# Patient Record
Sex: MALE | Race: WHITE | Employment: FULL TIME | URBAN - METROPOLITAN AREA
[De-identification: names, ages, dates, MRNs, and addresses within clinical notes are randomized per-mention and may not be internally consistent; named-entity substitution may affect disease eponyms.]

---

## 2021-09-18 PROCEDURE — 99285 EMERGENCY DEPT VISIT HI MDM: CPT

## 2021-09-19 ENCOUNTER — APPOINTMENT (EMERGENCY)
Dept: CT IMAGING | Facility: HOSPITAL | Age: 56
DRG: 440 | End: 2021-09-19
Payer: COMMERCIAL

## 2021-09-19 ENCOUNTER — HOSPITAL ENCOUNTER (INPATIENT)
Facility: HOSPITAL | Age: 56
LOS: 2 days | Discharge: OTHER INSTITUTION | DRG: 440 | End: 2021-09-21
Attending: EMERGENCY MEDICINE | Admitting: INTERNAL MEDICINE
Payer: COMMERCIAL

## 2021-09-19 ENCOUNTER — APPOINTMENT (INPATIENT)
Dept: ULTRASOUND IMAGING | Facility: HOSPITAL | Age: 56
DRG: 440 | End: 2021-09-19
Payer: COMMERCIAL

## 2021-09-19 ENCOUNTER — APPOINTMENT (INPATIENT)
Dept: MRI IMAGING | Facility: HOSPITAL | Age: 56
DRG: 440 | End: 2021-09-19
Payer: COMMERCIAL

## 2021-09-19 DIAGNOSIS — K85.90 PANCREATITIS: Primary | ICD-10-CM

## 2021-09-19 DIAGNOSIS — F10.10 ALCOHOL ABUSE: ICD-10-CM

## 2021-09-19 DIAGNOSIS — K80.20 GALLSTONES: ICD-10-CM

## 2021-09-19 PROBLEM — R10.9 ABDOMINAL PAIN: Status: ACTIVE | Noted: 2021-09-19

## 2021-09-19 PROBLEM — R93.89 ABNORMAL CT SCAN: Status: ACTIVE | Noted: 2021-09-19

## 2021-09-19 PROBLEM — K59.00 CONSTIPATION: Status: ACTIVE | Noted: 2021-09-19

## 2021-09-19 LAB
ALBUMIN SERPL BCP-MCNC: 3.3 G/DL (ref 3.5–5)
ALP SERPL-CCNC: 115 U/L (ref 46–116)
ALT SERPL W P-5'-P-CCNC: 39 U/L (ref 12–78)
AMPHETAMINES SERPL QL SCN: NEGATIVE
ANION GAP SERPL CALCULATED.3IONS-SCNC: 8 MMOL/L (ref 4–13)
AST SERPL W P-5'-P-CCNC: 29 U/L (ref 5–45)
BARBITURATES UR QL: NEGATIVE
BASOPHILS # BLD AUTO: 0.03 THOUSANDS/ΜL (ref 0–0.1)
BASOPHILS NFR BLD AUTO: 0 % (ref 0–1)
BENZODIAZ UR QL: POSITIVE
BILIRUB SERPL-MCNC: 0.26 MG/DL (ref 0.2–1)
BILIRUB UR QL STRIP: NEGATIVE
BUN SERPL-MCNC: 13 MG/DL (ref 5–25)
CALCIUM ALBUM COR SERPL-MCNC: 9.4 MG/DL (ref 8.3–10.1)
CALCIUM SERPL-MCNC: 8.8 MG/DL (ref 8.3–10.1)
CHLORIDE SERPL-SCNC: 103 MMOL/L (ref 100–108)
CHOLEST SERPL-MCNC: 170 MG/DL (ref 50–200)
CLARITY UR: CLEAR
CO2 SERPL-SCNC: 30 MMOL/L (ref 21–32)
COCAINE UR QL: NEGATIVE
COLOR UR: YELLOW
CREAT SERPL-MCNC: 0.97 MG/DL (ref 0.6–1.3)
EOSINOPHIL # BLD AUTO: 0.2 THOUSAND/ΜL (ref 0–0.61)
EOSINOPHIL NFR BLD AUTO: 2 % (ref 0–6)
ERYTHROCYTE [DISTWIDTH] IN BLOOD BY AUTOMATED COUNT: 12.5 % (ref 11.6–15.1)
GFR SERPL CREATININE-BSD FRML MDRD: 87 ML/MIN/1.73SQ M
GLUCOSE SERPL-MCNC: 104 MG/DL (ref 65–140)
GLUCOSE UR STRIP-MCNC: NEGATIVE MG/DL
HCT VFR BLD AUTO: 38.8 % (ref 36.5–49.3)
HDLC SERPL-MCNC: 38 MG/DL
HGB BLD-MCNC: 13.2 G/DL (ref 12–17)
HGB UR QL STRIP.AUTO: NEGATIVE
KETONES UR STRIP-MCNC: NEGATIVE MG/DL
LACTATE SERPL-SCNC: 1 MMOL/L (ref 0.5–2)
LDLC SERPL CALC-MCNC: 82 MG/DL (ref 0–100)
LEUKOCYTE ESTERASE UR QL STRIP: NEGATIVE
LIPASE SERPL-CCNC: 1162 U/L (ref 73–393)
LYMPHOCYTES # BLD AUTO: 2.36 THOUSANDS/ΜL (ref 0.6–4.47)
LYMPHOCYTES NFR BLD AUTO: 27 % (ref 14–44)
MCH RBC QN AUTO: 34.6 PG (ref 26.8–34.3)
MCHC RBC AUTO-ENTMCNC: 34 G/DL (ref 31.4–37.4)
MCV RBC AUTO: 102 FL (ref 82–98)
METHADONE UR QL: NEGATIVE
MONOCYTES # BLD AUTO: 0.97 THOUSAND/ΜL (ref 0.17–1.22)
MONOCYTES NFR BLD AUTO: 11 % (ref 4–12)
NEUTROPHILS # BLD AUTO: 5.35 THOUSANDS/ΜL (ref 1.85–7.62)
NEUTS SEG NFR BLD AUTO: 60 % (ref 43–75)
NITRITE UR QL STRIP: NEGATIVE
OPIATES UR QL SCN: NEGATIVE
OXYCODONE+OXYMORPHONE UR QL SCN: NEGATIVE
PCP UR QL: NEGATIVE
PH UR STRIP.AUTO: 6 [PH]
PLATELET # BLD AUTO: 155 THOUSANDS/UL (ref 149–390)
PMV BLD AUTO: 10.5 FL (ref 8.9–12.7)
POTASSIUM SERPL-SCNC: 4.5 MMOL/L (ref 3.5–5.3)
PROT SERPL-MCNC: 7 G/DL (ref 6.4–8.2)
PROT UR STRIP-MCNC: NEGATIVE MG/DL
RBC # BLD AUTO: 3.81 MILLION/UL (ref 3.88–5.62)
SODIUM SERPL-SCNC: 141 MMOL/L (ref 136–145)
SP GR UR STRIP.AUTO: 1.02 (ref 1–1.03)
THC UR QL: NEGATIVE
TRIGL SERPL-MCNC: 252 MG/DL
UROBILINOGEN UR QL STRIP.AUTO: 0.2 E.U./DL
WBC # BLD AUTO: 8.91 THOUSAND/UL (ref 4.31–10.16)

## 2021-09-19 PROCEDURE — 96374 THER/PROPH/DIAG INJ IV PUSH: CPT

## 2021-09-19 PROCEDURE — 85025 COMPLETE CBC W/AUTO DIFF WBC: CPT | Performed by: EMERGENCY MEDICINE

## 2021-09-19 PROCEDURE — 74177 CT ABD & PELVIS W/CONTRAST: CPT

## 2021-09-19 PROCEDURE — G1004 CDSM NDSC: HCPCS

## 2021-09-19 PROCEDURE — 99222 1ST HOSP IP/OBS MODERATE 55: CPT | Performed by: INTERNAL MEDICINE

## 2021-09-19 PROCEDURE — 96375 TX/PRO/DX INJ NEW DRUG ADDON: CPT

## 2021-09-19 PROCEDURE — 96361 HYDRATE IV INFUSION ADD-ON: CPT

## 2021-09-19 PROCEDURE — 83605 ASSAY OF LACTIC ACID: CPT | Performed by: EMERGENCY MEDICINE

## 2021-09-19 PROCEDURE — A9585 GADOBUTROL INJECTION: HCPCS | Performed by: INTERNAL MEDICINE

## 2021-09-19 PROCEDURE — 74183 MRI ABD W/O CNTR FLWD CNTR: CPT

## 2021-09-19 PROCEDURE — 99222 1ST HOSP IP/OBS MODERATE 55: CPT | Performed by: FAMILY MEDICINE

## 2021-09-19 PROCEDURE — 83690 ASSAY OF LIPASE: CPT | Performed by: EMERGENCY MEDICINE

## 2021-09-19 PROCEDURE — 80061 LIPID PANEL: CPT | Performed by: INTERNAL MEDICINE

## 2021-09-19 PROCEDURE — C9113 INJ PANTOPRAZOLE SODIUM, VIA: HCPCS | Performed by: INTERNAL MEDICINE

## 2021-09-19 PROCEDURE — 76705 ECHO EXAM OF ABDOMEN: CPT

## 2021-09-19 PROCEDURE — 81003 URINALYSIS AUTO W/O SCOPE: CPT | Performed by: EMERGENCY MEDICINE

## 2021-09-19 PROCEDURE — 80053 COMPREHEN METABOLIC PANEL: CPT | Performed by: EMERGENCY MEDICINE

## 2021-09-19 PROCEDURE — 99285 EMERGENCY DEPT VISIT HI MDM: CPT | Performed by: EMERGENCY MEDICINE

## 2021-09-19 PROCEDURE — 36415 COLL VENOUS BLD VENIPUNCTURE: CPT | Performed by: EMERGENCY MEDICINE

## 2021-09-19 PROCEDURE — 80307 DRUG TEST PRSMV CHEM ANLYZR: CPT | Performed by: EMERGENCY MEDICINE

## 2021-09-19 RX ORDER — OXYCODONE HYDROCHLORIDE 10 MG/1
10 TABLET ORAL EVERY 4 HOURS PRN
Status: DISCONTINUED | OUTPATIENT
Start: 2021-09-19 | End: 2021-09-21 | Stop reason: HOSPADM

## 2021-09-19 RX ORDER — BACLOFEN 20 MG/1
20 TABLET ORAL 3 TIMES DAILY PRN
COMMUNITY

## 2021-09-19 RX ORDER — OXYCODONE HYDROCHLORIDE 5 MG/1
5 TABLET ORAL EVERY 4 HOURS PRN
Status: DISCONTINUED | OUTPATIENT
Start: 2021-09-19 | End: 2021-09-19

## 2021-09-19 RX ORDER — NICOTINE 21 MG/24HR
1 PATCH, TRANSDERMAL 24 HOURS TRANSDERMAL DAILY
Status: DISCONTINUED | OUTPATIENT
Start: 2021-09-19 | End: 2021-09-19

## 2021-09-19 RX ORDER — CLONIDINE HYDROCHLORIDE 0.1 MG/1
0.1 TABLET ORAL 3 TIMES DAILY PRN
COMMUNITY

## 2021-09-19 RX ORDER — ONDANSETRON 2 MG/ML
4 INJECTION INTRAMUSCULAR; INTRAVENOUS EVERY 6 HOURS PRN
Status: DISCONTINUED | OUTPATIENT
Start: 2021-09-19 | End: 2021-09-21 | Stop reason: HOSPADM

## 2021-09-19 RX ORDER — ONDANSETRON 2 MG/ML
4 INJECTION INTRAMUSCULAR; INTRAVENOUS ONCE
Status: COMPLETED | OUTPATIENT
Start: 2021-09-19 | End: 2021-09-19

## 2021-09-19 RX ORDER — MULTIVITAMIN
1 TABLET ORAL DAILY
COMMUNITY

## 2021-09-19 RX ORDER — PANTOPRAZOLE SODIUM 40 MG/1
40 INJECTION, POWDER, FOR SOLUTION INTRAVENOUS ONCE
Status: COMPLETED | OUTPATIENT
Start: 2021-09-19 | End: 2021-09-19

## 2021-09-19 RX ORDER — MAGNESIUM OXIDE/MAG AA CHELATE 300 MG
350 CAPSULE ORAL DAILY
COMMUNITY

## 2021-09-19 RX ORDER — HYDROMORPHONE HCL/PF 1 MG/ML
0.5 SYRINGE (ML) INJECTION EVERY 6 HOURS PRN
Status: DISCONTINUED | OUTPATIENT
Start: 2021-09-19 | End: 2021-09-21 | Stop reason: HOSPADM

## 2021-09-19 RX ORDER — MORPHINE SULFATE 4 MG/ML
4 INJECTION, SOLUTION INTRAMUSCULAR; INTRAVENOUS ONCE
Status: COMPLETED | OUTPATIENT
Start: 2021-09-19 | End: 2021-09-19

## 2021-09-19 RX ORDER — GAUZE BANDAGE 2" X 2"
100 BANDAGE TOPICAL DAILY
COMMUNITY

## 2021-09-19 RX ORDER — DOXEPIN HYDROCHLORIDE 50 MG/1
50 CAPSULE ORAL
COMMUNITY

## 2021-09-19 RX ORDER — LANOLIN ALCOHOL/MO/W.PET/CERES
100 CREAM (GRAM) TOPICAL DAILY
Status: DISCONTINUED | OUTPATIENT
Start: 2021-09-19 | End: 2021-09-21 | Stop reason: HOSPADM

## 2021-09-19 RX ORDER — LEVETIRACETAM 750 MG/1
750 TABLET ORAL EVERY 12 HOURS
COMMUNITY

## 2021-09-19 RX ORDER — KETOROLAC TROMETHAMINE 30 MG/ML
15 INJECTION, SOLUTION INTRAMUSCULAR; INTRAVENOUS EVERY 6 HOURS PRN
Status: DISPENSED | OUTPATIENT
Start: 2021-09-19 | End: 2021-09-21

## 2021-09-19 RX ORDER — HYDROMORPHONE HCL/PF 1 MG/ML
0.5 SYRINGE (ML) INJECTION ONCE
Status: COMPLETED | OUTPATIENT
Start: 2021-09-19 | End: 2021-09-19

## 2021-09-19 RX ORDER — HYDROXYZINE HYDROCHLORIDE 25 MG/1
25 TABLET, FILM COATED ORAL EVERY 4 HOURS PRN
COMMUNITY

## 2021-09-19 RX ORDER — PHENOL 1.4 %
10 AEROSOL, SPRAY (ML) MUCOUS MEMBRANE
COMMUNITY

## 2021-09-19 RX ORDER — SODIUM CHLORIDE 9 MG/ML
150 INJECTION, SOLUTION INTRAVENOUS CONTINUOUS
Status: DISCONTINUED | OUTPATIENT
Start: 2021-09-19 | End: 2021-09-21

## 2021-09-19 RX ORDER — ACETAMINOPHEN 325 MG/1
650 TABLET ORAL EVERY 6 HOURS PRN
Status: DISCONTINUED | OUTPATIENT
Start: 2021-09-19 | End: 2021-09-21 | Stop reason: HOSPADM

## 2021-09-19 RX ORDER — FOLIC ACID 1 MG/1
TABLET ORAL DAILY
COMMUNITY

## 2021-09-19 RX ORDER — FOLIC ACID 1 MG/1
1 TABLET ORAL DAILY
Status: DISCONTINUED | OUTPATIENT
Start: 2021-09-19 | End: 2021-09-21 | Stop reason: HOSPADM

## 2021-09-19 RX ORDER — OXAZEPAM 15 MG/1
15 CAPSULE ORAL AS NEEDED
COMMUNITY

## 2021-09-19 RX ORDER — NICOTINE 21 MG/24HR
1 PATCH, TRANSDERMAL 24 HOURS TRANSDERMAL DAILY
Status: DISCONTINUED | OUTPATIENT
Start: 2021-09-19 | End: 2021-09-21 | Stop reason: HOSPADM

## 2021-09-19 RX ADMIN — THIAMINE HCL TAB 100 MG 100 MG: 100 TAB at 10:13

## 2021-09-19 RX ADMIN — SODIUM CHLORIDE 100 ML/HR: 0.9 INJECTION, SOLUTION INTRAVENOUS at 05:38

## 2021-09-19 RX ADMIN — FOLIC ACID 1 MG: 1 TABLET ORAL at 10:13

## 2021-09-19 RX ADMIN — ONDANSETRON 4 MG: 2 INJECTION INTRAMUSCULAR; INTRAVENOUS at 02:54

## 2021-09-19 RX ADMIN — GADOBUTROL 8 ML: 604.72 INJECTION INTRAVENOUS at 16:46

## 2021-09-19 RX ADMIN — HYDROMORPHONE HYDROCHLORIDE 0.5 MG: 1 INJECTION, SOLUTION INTRAMUSCULAR; INTRAVENOUS; SUBCUTANEOUS at 05:39

## 2021-09-19 RX ADMIN — MORPHINE SULFATE 4 MG: 4 INJECTION INTRAVENOUS at 02:53

## 2021-09-19 RX ADMIN — ENOXAPARIN SODIUM 40 MG: 40 INJECTION SUBCUTANEOUS at 08:49

## 2021-09-19 RX ADMIN — OXYCODONE HYDROCHLORIDE 10 MG: 10 TABLET ORAL at 13:57

## 2021-09-19 RX ADMIN — SODIUM CHLORIDE 1000 ML: 0.9 INJECTION, SOLUTION INTRAVENOUS at 02:54

## 2021-09-19 RX ADMIN — Medication 1 PATCH: at 05:50

## 2021-09-19 RX ADMIN — OXYCODONE HYDROCHLORIDE 10 MG: 10 TABLET ORAL at 08:49

## 2021-09-19 RX ADMIN — IOHEXOL 100 ML: 350 INJECTION, SOLUTION INTRAVENOUS at 03:11

## 2021-09-19 RX ADMIN — Medication 1 PATCH: at 18:38

## 2021-09-19 RX ADMIN — MULTIPLE VITAMINS W/ MINERALS TAB 1 TABLET: TAB ORAL at 10:13

## 2021-09-19 RX ADMIN — PANTOPRAZOLE SODIUM 40 MG: 40 INJECTION, POWDER, FOR SOLUTION INTRAVENOUS at 10:14

## 2021-09-19 RX ADMIN — HYDROMORPHONE HYDROCHLORIDE 0.5 MG: 1 INJECTION, SOLUTION INTRAMUSCULAR; INTRAVENOUS; SUBCUTANEOUS at 21:34

## 2021-09-19 RX ADMIN — OXYCODONE HYDROCHLORIDE 10 MG: 10 TABLET ORAL at 18:08

## 2021-09-19 NOTE — ASSESSMENT & PLAN NOTE
· Patient with history of alcohol abuse  · Currently attending [facility]  · Patient reports last drink was approximately 1 week ago  · No signs of withdrawal noted this time  · Continue CIWA protocol

## 2021-09-19 NOTE — ASSESSMENT & PLAN NOTE
Patient currently attending [facility] for alcohol addiction rehab  Last drink three days ago with no withdrawal complaints  Has been constipated for past 6 days, with associated epigastric abdominal pain, radiating to RLQ, nausea and two episodes of vomiting  Received an enema at the recovery center which did not produce a BM  Patient then sent to ED for medical clearance  While in the waiting room the patient had a large BM  Currently reporting epigastric abdominal pain, without nausea or other symptom      /84 (BP Location: Left arm)   Pulse 73   Temp 98 °F (36 7 °C) (Tympanic)   Resp 19   Ht 6' 2" (1 88 m)   Wt 88 5 kg (195 lb)   SpO2 98%   BMI 25 04 kg/m²     · CT suggestive of pancreatitis   · No leukocytosis, no transaminitis, lactic acid WNL, no acute electrolyte abnormality  · UA WNL, drug screen pending  · Likely alcoholic pancreatitis  · NPO, Continuous IVF hydration, prn zofran, pain control ordered   · AM Lipase, CBC, and CMP ordered  · GI consultation

## 2021-09-19 NOTE — ASSESSMENT & PLAN NOTE
· CT abd w/ contrast showing:  ·  Ill-defined hypodensities in the R-lobe of the liver, MRI recommended  · Noncalcified stone in the gallbladder neck  · MRI report noted with moderate hepatomegaly, hemangioma, small cyst   · Right upper quadrant also report noted with distended gallbladder sludge, no evidence of cholelithiasis, cholecystitis, CBD dilation    · GI following

## 2021-09-19 NOTE — ASSESSMENT & PLAN NOTE
· Patient with history of alcohol abuse  · Currently attending [facility] for rehabilitation  · Last drink three days ago with no withdrawal complaints  · Patient confident he's done drinking

## 2021-09-19 NOTE — PLAN OF CARE
Problem: PAIN - ADULT  Goal: Verbalizes/displays adequate comfort level or baseline comfort level  Description: Interventions:  - Encourage patient to monitor pain and request assistance  - Assess pain using appropriate pain scale  - Administer analgesics based on type and severity of pain and evaluate response  - Implement non-pharmacological measures as appropriate and evaluate response  - Consider cultural and social influences on pain and pain management  - Notify physician/advanced practitioner if interventions unsuccessful or patient reports new pain  Outcome: Progressing     Problem: INFECTION - ADULT  Goal: Absence or prevention of progression during hospitalization  Description: INTERVENTIONS:  - Assess and monitor for signs and symptoms of infection  - Monitor lab/diagnostic results  - Monitor all insertion sites, i e  indwelling lines, tubes, and drains  - Monitor endotracheal if appropriate and nasal secretions for changes in amount and color  - [place] appropriate cooling/warming therapies per order  - Administer medications as ordered  - Instruct and encourage patient and family to use good hand hygiene technique  - Identify and instruct in appropriate isolation precautions for identified infection/condition  Outcome: Progressing  Goal: Absence of fever/infection during neutropenic period  Description: INTERVENTIONS:  - Monitor WBC    Outcome: Progressing     Problem: SAFETY ADULT  Goal: Patient will remain free of falls  Description: INTERVENTIONS:  - Educate patient/family on patient safety including physical limitations  - Instruct patient to call for assistance with activity   - Consult OT/PT to assist with strengthening/mobility   - Keep Call bell within reach  - Keep bed low and locked with side rails adjusted as appropriate  - Keep care items and personal belongings within reach  - Initiate and maintain comfort rounds  - Make Fall Risk Sign visible to staff  - Offer Toileting every 2 Hours, in advance of need  - Initiate/Maintain bed alarm  - Obtain necessary fall risk management equipment: chair alarm  - Apply yellow socks and bracelet for high fall risk patients  - Consider moving patient to room near nurses station  Outcome: Progressing  Goal: Maintain or return to baseline ADL function  Description: INTERVENTIONS:  -  Assess patient's ability to carry out ADLs; assess patient's baseline for ADL function and identify physical deficits which impact ability to perform ADLs (bathing, care of mouth/teeth, toileting, grooming, dressing, etc )  - Assess/evaluate cause of self-care deficits   - Assess range of motion  - Assess patient's mobility; develop plan if impaired  - Assess patient's need for assistive devices and provide as appropriate  - Encourage maximum independence but intervene and supervise when necessary  - Involve family in performance of ADLs  - Assess for home care needs following discharge   - Consider OT consult to assist with ADL evaluation and planning for discharge  - Provide patient education as appropriate  Outcome: Progressing  Goal: Maintains/Returns to pre admission functional level  Description: INTERVENTIONS:  - Perform BMAT or MOVE assessment daily    - Set and communicate daily mobility goal to care team and patient/family/caregiver  - Collaborate with rehabilitation services on mobility goals if consulted  - Perform Range of Motion 3 times a day  - Reposition patient every 3 hours    - Dangle patient 3 times a day  - Stand patient 3 times a day  - Ambulate patient 3 times a day  - Out of bed to chair 3 times a day   - Out of bed for meals 3 times a day  - Out of bed for toileting  - Record patient progress and toleration of activity level   Outcome: Progressing     Problem: DISCHARGE PLANNING  Goal: Discharge to home or other facility with appropriate resources  Description: INTERVENTIONS:  - Identify barriers to discharge w/patient and caregiver  - Arrange for needed discharge resources and transportation as appropriate  - Identify discharge learning needs (meds, wound care, etc )  - Arrange for interpretive services to assist at discharge as needed  - Refer to Case Management Department for coordinating discharge planning if the patient needs post-hospital services based on physician/advanced practitioner order or complex needs related to functional status, cognitive ability, or social support system  Outcome: Progressing     Problem: Knowledge Deficit  Goal: Patient/family/caregiver demonstrates understanding of disease process, treatment plan, medications, and discharge instructions  Description: Complete learning assessment and assess knowledge base    Interventions:  - Provide teaching at level of understanding  - Provide teaching via preferred learning methods  Outcome: Progressing     Problem: GASTROINTESTINAL - ADULT  Goal: Minimal or absence of nausea and/or vomiting  Description: INTERVENTIONS:  - Administer IV fluids if ordered to ensure adequate hydration  - Maintain NPO status until nausea and vomiting are resolved  - Nasogastric tube if ordered  - Administer ordered antiemetic medications as needed  - Provide nonpharmacologic comfort measures as appropriate  - Advance diet as tolerated, if ordered  - Consider nutrition services referral to assist patient with adequate nutrition and appropriate food choices  Outcome: Progressing  Goal: Maintains or returns to baseline bowel function  Description: INTERVENTIONS:  - Assess bowel function  - Encourage oral fluids to ensure adequate hydration  - Administer IV fluids if ordered to ensure adequate hydration  - Administer ordered medications as needed  - Encourage mobilization and activity  - Consider nutritional services referral to assist patient with adequate nutrition and appropriate food choices  Outcome: Progressing  Goal: Maintains adequate nutritional intake  Description: INTERVENTIONS:  - Monitor percentage of each meal consumed  - Identify factors contributing to decreased intake, treat as appropriate  - Assist with meals as needed  - Monitor I&O, weight, and lab values if indicated  - Obtain nutrition services referral as needed  Outcome: Progressing

## 2021-09-19 NOTE — CONSULTS
Consultation - [facility]  Radha Womack 64 y o  male MRN: [id number]  Unit/Bed#: ED 23 Encounter: [id number]        Consults    Reason for Consult / Principal Problem: pancreatitis, abnormal CT scan    HPI: This 64year old male with a long history of alcohol abuse was attending the [address] [place] [address] for alcohol rehab when he developed the sudden onset of epigastric abdominal pain radiating to the right upper quadrant  Pain was associated with nausea and two episodes of vomiting  No hematemesis  His last alcohol was 3 days earlier  Patient has had one prior episode of pancreatitis  He was also complaining of constipation with no bowel movement for 6 days  No history of narcotic analgesics  He was given an enema at the rehab facility with no BM  Patient came to the ER and it was documented that he had a large BM  CT scan of the abdomen shows right basilar subsegmental atelectatic changes, a fatty liver, minimal fluid about the body and tail, calcification in the head of the pancreas, air fluid level in the rectum, diverticulosis, an ill-defined area of hypodensity in the right lobe of the liver, a questionable noncalcified stone in the gallbladder neck, a 2 8 X 1 7 cm well circumscribed exophytic lesion from the left kidney which does not meet CT criteria for a simple cyst       The CBC is normal    The liver enzymes were normal   Lipase level was 1,162  REVIEW OF SYSTEMS:    CONSTITUTIONAL: Denies any fever, chills, or rigors  Good appetite, and no recent weight loss  HEENT: No earache or tinnitus  Denies hearing loss or visual disturbances  CARDIOVASCULAR: No chest pain or palpitations  RESPIRATORY: Denies any cough, hemoptysis, shortness of breath or dyspnea on exertion  GASTROINTESTINAL: As noted in the History of Present Illness  GENITOURINARY: No problems with urination  Denies any hematuria or dysuria    NEUROLOGIC: No dizziness or vertigo, denies headaches  Admits to numbness  MUSCULOSKELETAL: Denies any muscle or joint pain  SKIN: Denies skin rashes or itching  ENDOCRINE: Denies excessive thirst  Denies intolerance to heat or cold  PSYCHOSOCIAL: Denies depression or anxiety  Denies any recent memory loss  Historical Information   History reviewed  No pertinent past medical history  Past Surgical History:   Procedure Laterality Date    FRACTURE SURGERY       Social History   Social History     Substance and Sexual Activity   Alcohol Use Not Currently     Social History     Substance and Sexual Activity   Drug Use Not Currently     Social History     Tobacco Use   Smoking Status Current Every Day Smoker    Packs/day: 1 00    Types: Cigarettes   Smokeless Tobacco Never Used     History reviewed  No pertinent family history  Meds/Allergies     (Not in a hospital admission)    Current Facility-Administered Medications   Medication Dose Route Frequency    acetaminophen (TYLENOL) tablet 650 mg  650 mg Oral Q6H PRN    enoxaparin (LOVENOX) subcutaneous injection 40 mg  40 mg Subcutaneous Daily    folic acid (FOLVITE) tablet 1 mg  1 mg Oral Daily    HYDROmorphone (DILAUDID) injection 0 5 mg  0 5 mg Intravenous Q6H PRN    ketorolac (TORADOL) injection 15 mg  15 mg Intravenous Q6H PRN    multivitamin-minerals (CENTRUM) tablet 1 tablet  1 tablet Oral Daily    nicotine (NICODERM CQ) 21 mg/24 hr TD 24 hr patch 1 patch  1 patch Transdermal Daily    ondansetron (ZOFRAN) injection 4 mg  4 mg Intravenous Q6H PRN    oxyCODONE (ROXICODONE) immediate release tablet 10 mg  10 mg Oral Q4H PRN    sodium chloride 0 9 % infusion  150 mL/hr Intravenous Continuous    thiamine tablet 100 mg  100 mg Oral Daily       No Known Allergies        Objective     Blood pressure 137/84, pulse 73, temperature 98 °F (36 7 °C), temperature source Tympanic, resp  rate 19, height 6' 2" (1 88 m), weight 88 5 kg (195 lb), SpO2 98 %      No intake or output data in the 24 hours ending 09/19/21 1026      PHYSICAL EXAM:      General Appearance:   Alert, cooperative, no distress, appears stated age    HEENT:   Normocephalic, atraumatic, anicteric, PERRLA   Neck:  Supple, symmetrical, trachea midline, no adenopathy;    thyroid: no enlargement/tenderness/nodules; no carotid  bruit or JVD    Lungs:   Clear to auscultation bilaterally; no rales, rhonchi or wheezing; respirations unlabored    Heart[de-identified]   S1 and S2 normal; regular rate and rhythm; no murmur, rub, or gallop  Abdomen:   Soft, tender epigastrium and left upper quadrant, non-distended; normal bowel sounds; no masses, no organomegaly, no rebound or guarding   Genitalia:   Deferred    Rectal:   Deferred    Extremities:  No cyanosis, clubbing or edema    Pulses:  2+ and symmetric all extremities    Skin:  Skin color, texture, turgor normal, no rashes or lesions    Lymph nodes:  No palpable cervical, axillary or inguinal lymphadenopathy        Lab Results:   Results from last 7 days   Lab Units 09/19/21  0044   WBC Thousand/uL 8 91   HEMOGLOBIN g/dL 13 2   HEMATOCRIT % 38 8   PLATELETS Thousands/uL 155   NEUTROS PCT % 60   LYMPHS PCT % 27   MONOS PCT % 11   EOS PCT % 2     Results from last 7 days   Lab Units 09/19/21  0044   POTASSIUM mmol/L 4 5   CHLORIDE mmol/L 103   CO2 mmol/L 30   BUN mg/dL 13   CREATININE mg/dL 0 97   CALCIUM mg/dL 8 8   ALK PHOS U/L 115   ALT U/L 39   AST U/L 29         Results from last 7 days   Lab Units 09/19/21  0044   LIPASE u/L 1,162*       Imaging Studies: I have personally reviewed pertinent imaging studies  CT abdomen pelvis with contrast    Result Date: 9/19/2021  Impression: There is minimal fluid about the body and tail the pancreas raising the possibility of pancreatitis  Recommend correlation with laboratory results    There are ill-defined areas of hypodensity in the right lobe of the liver (2:15; 2:23) for which elective MRI abdomen with contrast, hepatic protocol, is recommended for further evaluation    There is a questionable noncalcified stone at the gallbladder neck  Borderline pericholecystic fluid  Recommend clinical correlation  Consider additional imaging such as HIDA scan or ultrasound  2 8 x 1 7 cm well-circumscribed exophytic lesion from the left kidney which does not meet CT criteria for simple cyst  Recommend elective ultrasound or MRI for further evaluation  Air-fluid level in the rectum compatible with the provided history of recent prior enema  Wall thickening of the distal sigmoid colon and rectum with loss of haustration concerning for colitis  There is diverticulosis  No evidence for diverticulitis  The study was marked in [facility] for immediate notification  Workstation performed: [id number]       ASSESSMENT and PLAN:      1  Acute pancreatitis, alcohol induced  - second pancreatitis episode  - liver enzymes normal  - CT demonstrates minimal fluid about the body and tail, fatty liver  - lipase 1,162; no value to continue following lipase levels  - Patient advised that all alcohol must stop to alter the future of his pancreatitis  - NPO, pain management, antiemetics prn, incentive spirometry  - IV fluids at 200 cc/hour  - moniter labs daily, CMP, CBC    2  Alcoholism  - Patient attending [facility] for alcohol rehab  - liver enzymes normal     3  Constipation    4   Abnormal CT scan of the liver  - ill-defined hypodensity in the right lobe of liver  - MRI ordered  - Non-calcified stone in the GB neck, US or HIDA recommended; US ordered

## 2021-09-19 NOTE — ED NOTES
Patient returned from [facility] scan     Amie Cai RN  09/19/21 0603

## 2021-09-19 NOTE — ED PROVIDER NOTES
History  Chief Complaint   Patient presents with    Abdominal Pain     Patient co abdominal pain  Patient reports no bowel movement time 6 days  Patient reports taking enema with no relief  HPI     63 yo M presents from [facility] for alcohol addiction, last drink three days ago with no complaints of withdrawal presents for medical clearance after being constipated for 6 days  He has no complaints now  States he was constipated and no BM for 6 days  Was given enema at recovery center and still no BM so sent to the ER  Patient while in the waiting room had a large BM and has since had no symptoms  No abd pain n/v  He denies recent opioid use  He states he still needs a letter giving medical clearance to go back to [facility]  Prior to Admission Medications   Prescriptions Last Dose Informant Patient Reported? Taking?    Magnesium 300 MG CAPS   Yes Yes   Sig: Take 350 mg by mouth daily   Melatonin 10 MG TABS   Yes Yes   Sig: Take 10 mg by mouth daily at bedtime as needed   Multiple Vitamin (multivitamin) tablet   Yes Yes   Sig: Take 1 tablet by mouth daily   Thiamine Mononitrate (VITAMIN B1) 100 mg tablet   Yes Yes   Sig: Take 100 mg by mouth daily   baclofen 20 mg tablet   Yes Yes   Sig: Take 20 mg by mouth 3 (three) times a day as needed for muscle spasms   cloNIDine (CATAPRES) 0 1 mg tablet   Yes Yes   Sig: Take 0 1 mg by mouth 3 (three) times a day as needed for high blood pressure   doxepin (SINEquan) 50 mg capsule   Yes Yes   Sig: Take 50 mg by mouth daily at bedtime as needed for sleep (If melatonin is ineffective)   folic acid (FOLVITE) 1 mg tablet   Yes Yes   Sig: Take by mouth daily   hydrOXYzine HCL (ATARAX) 25 mg tablet   Yes Yes   Sig: Take 25 mg by mouth every 4 (four) hours as needed for itching   levETIRAcetam (KEPPRA) 750 mg tablet   Yes Yes   Sig: Take 750 mg by mouth every 12 (twelve) hours   oxazepam (SERAX) 15 mg capsule   Yes Yes   Sig: Take 15 mg by mouth as needed for withdrawal      Facility-Administered Medications: None       History reviewed  No pertinent past medical history  Past Surgical History:   Procedure Laterality Date    FRACTURE SURGERY         History reviewed  No pertinent family history  I have reviewed and agree with the history as documented  E-Cigarette/Vaping     E-Cigarette/Vaping Substances     Social History     Tobacco Use    Smoking status: Current Every Day Smoker     Packs/day: 1 00     Types: Cigarettes    Smokeless tobacco: Never Used   Substance Use Topics    Alcohol use: Not Currently    Drug use: Not Currently       Review of Systems   Constitutional: Negative for chills, fatigue and fever  HENT: Negative for nosebleeds and sore throat  Eyes: Negative for redness and visual disturbance  Respiratory: Negative for shortness of breath and wheezing  Cardiovascular: Negative for chest pain and palpitations  Gastrointestinal: Negative for abdominal pain and diarrhea  Endocrine: Negative for polyuria  Genitourinary: Negative for difficulty urinating and testicular pain  Musculoskeletal: Negative for back pain and neck stiffness  Skin: Negative for rash and wound  Neurological: Negative for seizures, speech difficulty and headaches  Psychiatric/Behavioral: Negative for dysphoric mood and hallucinations  All other systems reviewed and are negative  Physical Exam  Physical Exam  Vitals and nursing note reviewed  Constitutional:       Appearance: He is well-developed  HENT:      Head: Normocephalic and atraumatic  Right Ear: External ear normal       Left Ear: External ear normal    Eyes:      Conjunctiva/sclera: Conjunctivae normal    Cardiovascular:      Rate and Rhythm: Normal rate and regular rhythm  Heart sounds: Normal heart sounds  Pulmonary:      Effort: Pulmonary effort is normal       Breath sounds: Normal breath sounds  No wheezing  Chest:      Chest wall: No tenderness     Abdominal: General: Bowel sounds are normal       Palpations: Abdomen is soft  Tenderness: There is no abdominal tenderness  There is no guarding  Comments: Abdomen completely soft and non tender   Musculoskeletal:         General: Normal range of motion  Cervical back: Normal range of motion  Skin:     General: Skin is warm and dry  Findings: No rash  Neurological:      Mental Status: He is alert and oriented to person, place, and time  Cranial Nerves: No cranial nerve deficit  Sensory: No sensory deficit  Motor: No abnormal muscle tone        Coordination: Coordination normal          Vital Signs  ED Triage Vitals   Temperature Pulse Respirations Blood Pressure SpO2   09/18/21 2146 09/18/21 2146 09/18/21 2146 09/18/21 2146 09/18/21 2146   98 °F (36 7 °C) 101 19 (!) 180/103 100 %      Temp Source Heart Rate Source Patient Position - Orthostatic VS BP Location FiO2 (%)   09/18/21 2146 09/18/21 2146 -- 09/18/21 2146 --   Tympanic Monitor  Right arm       Pain Score       09/19/21 0253       8           Vitals:    09/19/21 1835 09/19/21 2230 09/20/21 0721 09/20/21 1546   BP: 127/90 128/76 130/86 125/86   Pulse: 63 60 58 59         Visual Acuity      ED Medications  Medications   acetaminophen (TYLENOL) tablet 650 mg (650 mg Oral Given 9/20/21 0814)   ondansetron (ZOFRAN) injection 4 mg (has no administration in time range)   sodium chloride 0 9 % infusion (150 mL/hr Intravenous New Bag 9/20/21 1638)   enoxaparin (LOVENOX) subcutaneous injection 40 mg (40 mg Subcutaneous Given 9/20/21 0802)   oxyCODONE (ROXICODONE) immediate release tablet 10 mg (10 mg Oral Given 9/20/21 1519)   HYDROmorphone (DILAUDID) injection 0 5 mg (0 5 mg Intravenous Given 9/20/21 1636)   nicotine (NICODERM CQ) 21 mg/24 hr TD 24 hr patch 1 patch (1 patch Transdermal Medication Applied 9/20/21 0802)   ketorolac (TORADOL) injection 15 mg (15 mg Intravenous Given 9/20/21 1010)   thiamine tablet 100 mg (100 mg Oral Given 7/93/44 3349)   folic acid (FOLVITE) tablet 1 mg (1 mg Oral Given 9/20/21 0801)   multivitamin-minerals (CENTRUM) tablet 1 tablet (1 tablet Oral Given 9/20/21 0802)   sodium chloride 0 9 % bolus 1,000 mL (0 mL Intravenous Stopped 9/19/21 0843)   morphine (PF) 4 mg/mL injection 4 mg (4 mg Intravenous Given 9/19/21 0253)   ondansetron (ZOFRAN) injection 4 mg (4 mg Intravenous Given 9/19/21 0254)   iohexol (OMNIPAQUE) 350 MG/ML injection (MULTI-DOSE) 100 mL (100 mL Intravenous Given 9/19/21 0311)   HYDROmorphone (DILAUDID) injection 0 5 mg (0 5 mg Intravenous Given 9/19/21 0539)   pantoprazole (PROTONIX) injection 40 mg (40 mg Intravenous Given 9/19/21 1014)   Gadobutrol injection (SINGLE-DOSE) SOLN 8 mL (8 mL Intravenous Given 9/19/21 1646)       Diagnostic Studies  Results Reviewed     Procedure Component Value Units Date/Time    Comprehensive metabolic panel [[id number]]  (Abnormal) Collected: 09/20/21 0447    Lab Status: Final result Specimen: Blood from Hand, Right Updated: 09/20/21 0556     Sodium 138 mmol/L      Potassium 4 1 mmol/L      Chloride 106 mmol/L      CO2 24 mmol/L      ANION GAP 8 mmol/L      BUN 8 mg/dL      Creatinine 0 87 mg/dL      Glucose 83 mg/dL      Calcium 8 8 mg/dL      Corrected Calcium 9 8 mg/dL      AST 27 U/L      ALT 32 U/L      Alkaline Phosphatase 76 U/L      Total Protein 6 3 g/dL      Albumin 2 7 g/dL      Total Bilirubin 0 41 mg/dL      eGFR 96 ml/min/1 73sq m     Narrative:      [place] guidelines for Chronic Kidney Disease (CKD):     Stage 1 with normal or high GFR (GFR > 90 mL/min/1 73 square meters)    Stage 2 Mild CKD (GFR = 60-89 mL/min/1 73 square meters)    Stage 3A Moderate CKD (GFR = 45-59 mL/min/1 73 square meters)    Stage 3B Moderate CKD (GFR = 30-44 mL/min/1 73 square meters)    Stage 4 Severe CKD (GFR = 15-29 mL/min/1 73 square meters)    Stage 5 End Stage CKD (GFR <15 mL/min/1 73 square meters)  Note: GFR calculation is accurate only with a steady state creatinine    Lipase [[id number]]  (Normal) Collected: 09/20/21 0447    Lab Status: Final result Specimen: Blood from Hand, Right Updated: 09/20/21 0544     Lipase 216 u/L     Lipid Panel with Direct LDL reflex [[id number]]  (Abnormal) Collected: 09/19/21 0044    Lab Status: Final result Specimen: Blood from Arm, Right Updated: 09/19/21 0953     Cholesterol 170 mg/dL      Triglycerides 252 mg/dL      HDL, Direct 38 mg/dL      LDL Calculated 82 mg/dL     Rapid drug screen, urine [[id number]]  (Abnormal) Collected: 09/19/21 0039    Lab Status: Final result Specimen: Urine, Clean Catch Updated: 09/19/21 0634     Amph/Meth UR Negative     Barbiturate Ur Negative     Benzodiazepine Urine Positive     Cocaine Urine Negative     Methadone Urine Negative     Opiate Urine Negative     PCP Ur Negative     THC Urine Negative     Oxycodone Urine Negative    Narrative:      Presumptive report  If requested, specimen will be sent to reference lab for confirmation  FOR MEDICAL PURPOSES ONLY  IF CONFIRMATION NEEDED PLEASE CONTACT THE LAB WITHIN 5 DAYS      Drug Screen Cutoff Levels:  AMPHETAMINE/METHAMPHETAMINES  1000 ng/mL  BARBITURATES     200 ng/mL  BENZODIAZEPINES     200 ng/mL  COCAINE      300 ng/mL  METHADONE      300 ng/mL  OPIATES      300 ng/mL  PHENCYCLIDINE     25 ng/mL  THC       50 ng/mL  OXYCODONE      100 ng/mL    CBC and differential [[id number]]     Lab Status: No result Specimen: Blood     Platelet count [[id number]]     Lab Status: No result Specimen: Blood     Comprehensive metabolic panel [[id number]]  (Abnormal) Collected: 09/19/21 0044    Lab Status: Final result Specimen: Blood from Arm, Right Updated: 09/19/21 0112     Sodium 141 mmol/L      Potassium 4 5 mmol/L      Chloride 103 mmol/L      CO2 30 mmol/L      ANION GAP 8 mmol/L      BUN 13 mg/dL      Creatinine 0 97 mg/dL      Glucose 104 mg/dL      Calcium 8 8 mg/dL      Corrected Calcium 9 4 mg/dL      AST 29 U/L      ALT 39 U/L Alkaline Phosphatase 115 U/L      Total Protein 7 0 g/dL      Albumin 3 3 g/dL      Total Bilirubin 0 26 mg/dL      eGFR 87 ml/min/1 73sq m     Narrative:      [place] guidelines for Chronic Kidney Disease (CKD):     Stage 1 with normal or high GFR (GFR > 90 mL/min/1 73 square meters)    Stage 2 Mild CKD (GFR = 60-89 mL/min/1 73 square meters)    Stage 3A Moderate CKD (GFR = 45-59 mL/min/1 73 square meters)    Stage 3B Moderate CKD (GFR = 30-44 mL/min/1 73 square meters)    Stage 4 Severe CKD (GFR = 15-29 mL/min/1 73 square meters)    Stage 5 End Stage CKD (GFR <15 mL/min/1 73 square meters)  Note: GFR calculation is accurate only with a steady state creatinine    Lipase [[id number]]  (Abnormal) Collected: 09/19/21 0044    Lab Status: Final result Specimen: Blood from Arm, Right Updated: 09/19/21 0112     Lipase 1,162 u/L     Lactic acid [[id number]]  (Normal) Collected: 09/19/21 0044    Lab Status: Final result Specimen: Blood from Arm, Right Updated: 09/19/21 0108     LACTIC ACID 1 0 mmol/L     Narrative:      Result may be elevated if tourniquet was used during collection      CBC and differential [[id number]]  (Abnormal) Collected: 09/19/21 0044    Lab Status: Final result Specimen: Blood from Arm, Right Updated: 09/19/21 0052     WBC 8 91 Thousand/uL      RBC 3 81 Million/uL      Hemoglobin 13 2 g/dL      Hematocrit 38 8 %       fL      MCH 34 6 pg      MCHC 34 0 g/dL      RDW 12 5 %      MPV 10 5 fL      Platelets 771 Thousands/uL      Neutrophils Relative 60 %      Lymphocytes Relative 27 %      Monocytes Relative 11 %      Eosinophils Relative 2 %      Basophils Relative 0 %      Neutrophils Absolute 5 35 Thousands/µL      Lymphocytes Absolute 2 36 Thousands/µL      Monocytes Absolute 0 97 Thousand/µL      Eosinophils Absolute 0 20 Thousand/µL      Basophils Absolute 0 03 Thousands/µL     UA (URINE) with reflex to Scope [[id number]] Collected: 09/19/21 0039    Lab Status: Final result Specimen: Urine, Clean Catch Updated: 09/19/21 0045     Color, UA Yellow     Clarity, UA Clear     Specific Gravity, UA 1 025     pH, UA 6 0     Leukocytes, UA Negative     Nitrite, UA Negative     Protein, UA Negative mg/dl      Glucose, UA Negative mg/dl      Ketones, UA Negative mg/dl      Urobilinogen, UA 0 2 E U /dl      Bilirubin, UA Negative     Blood, UA Negative                 US right upper quadrant   Final Result by Roma Torres MD (09/20 0805)      Distended gallbladder with sludge  There is no evidence of cholelithiasis or acute cholecystitis  There is also no evidence of biliary ductal dilatation  Moderate hepatomegaly  Workstation performed: [id number]         MRI abdomen w wo contrast and mrcp   Final Result by Roma Torres MD (09/20 0805)      Moderate hepatomegaly  There is a small hemangioma and small simple cyst in the liver  There are no suspicious liver lesions  The indeterminate exophytic left renal upper pole lesion seen on prior CT is shown to be a 2 7 x 1 7 cm hemorrhagic cyst (series 10,013, image 314 )               Workstation performed: [id number]         CT abdomen pelvis with contrast   Final Result by Kavon Taveras MD (09/19 0357)      There is minimal fluid about the body and tail the pancreas raising the possibility of pancreatitis  Recommend correlation with laboratory results         There are ill-defined areas of hypodensity in the right lobe of the liver (2:15; 2:23) for which elective MRI abdomen with contrast, hepatic protocol, is recommended for further evaluation         There is a questionable noncalcified stone at the gallbladder neck  Borderline pericholecystic fluid  Recommend clinical correlation  Consider additional imaging such as HIDA scan or ultrasound        2 8 x 1 7 cm well-circumscribed exophytic lesion from the left kidney which does not meet CT criteria for simple cyst  Recommend elective ultrasound or MRI for further evaluation  Air-fluid level in the rectum compatible with the provided history of recent prior enema  Wall thickening of the distal sigmoid colon and rectum with loss of haustration concerning for colitis  There is diverticulosis  No evidence for diverticulitis  The study was marked in [facility] for immediate notification  Workstation performed: [id number]                    Procedures  Procedures         ED Course  ED Course as of Sep 20 1707   Sun Sep 19, 2021   0106 WBC: 8 91   0106 Leukocytes, UA: Negative   0106 Nitrite, UA: Negative             MDM    63 yo w/ constipation, now resolved  To be medically cleared, patient will require labs  Labs showed elevated lipase  Patient now endorses luq abd pain  Ct scan consistent with pancreatitis  Admitted to medicine for further management  Disposition  Final diagnoses:   Pancreatitis   Alcohol abuse   Gallstones     Time reflects when diagnosis was documented in both MDM as applicable and the Disposition within this note     Time User Action Codes Description Comment    9/19/2021  4:14 AM Ramonia Hobson Add [K85 90] Pancreatitis     9/19/2021  4:14 AM Ramonia Hobson Add [F10 10] Alcohol abuse     9/19/2021  4:14 AM Ramonia Hobson Add [K80 20] Gallstones       ED Disposition     ED Disposition Condition Date/Time Comment    Admit Stable [place] Sep 19, 2021  4:14 AM Case was discussed with EARLE and the patient's admission status was agreed to be Admission Status: inpatient status to the service of Dr Gini Hernandez           Follow-up Information    None         Current Discharge Medication List      CONTINUE these medications which have NOT CHANGED    Details   baclofen 20 mg tablet Take 20 mg by mouth 3 (three) times a day as needed for muscle spasms      cloNIDine (CATAPRES) 0 1 mg tablet Take 0 1 mg by mouth 3 (three) times a day as needed for high blood pressure      doxepin (SINEquan) 50 mg capsule Take 50 mg by mouth daily at bedtime as needed for sleep (If melatonin is ineffective)      folic acid (FOLVITE) 1 mg tablet Take by mouth daily      hydrOXYzine HCL (ATARAX) 25 mg tablet Take 25 mg by mouth every 4 (four) hours as needed for itching      levETIRAcetam (KEPPRA) 750 mg tablet Take 750 mg by mouth every 12 (twelve) hours      Magnesium 300 MG CAPS Take 350 mg by mouth daily      Melatonin 10 MG TABS Take 10 mg by mouth daily at bedtime as needed      Multiple Vitamin (multivitamin) tablet Take 1 tablet by mouth daily      oxazepam (SERAX) 15 mg capsule Take 15 mg by mouth as needed for withdrawal      Thiamine Mononitrate (VITAMIN B1) 100 mg tablet Take 100 mg by mouth daily           No discharge procedures on file      PDMP Review     None          ED Provider  Electronically Signed by           Erika Cedeño MD  09/20/21 4149

## 2021-09-19 NOTE — ASSESSMENT & PLAN NOTE
· Patient reports no BM for the past 6 days, was given an enema at outpatient rehab facility without BM   Sent to ER by facility for medical clearance where pt then had a large volume bowel movement without blood  · Denies recent opioid use  · IVF hydration as above  · Bowel regimen when appropriate

## 2021-09-19 NOTE — H&P
[address] [place] [address]  [facility] 1965[facility] 64 [facility]  [facility] MRN: [id number]  Unit/Bed#: Marlen Newsome Encounter: [id number]  Primary Care Provider: No primary care provider on file  Date and time admitted to hospital: 9/19/2021 12:01 AM    * Acute pancreatitis  Assessment & Plan  Patient currently attending [facility] for alcohol addiction rehab  Last drink three days ago with no withdrawal complaints  Has been constipated for past 6 days, with associated epigastric abdominal pain, radiating to RLQ, nausea and two episodes of vomiting  Received an enema at the recovery center which did not produce a BM  Patient then sent to ED for medical clearance  While in the waiting room the patient had a large BM  Currently reporting epigastric abdominal pain, without nausea or other symptom  Patient reports having pancreatitis last January and states symptoms are similar  /84 (BP Location: Left arm)   Pulse 73   Temp 98 °F (36 7 °C) (Tympanic)   Resp 19   Ht 6' 2" (1 88 m)   Wt 88 5 kg (195 lb)   SpO2 98%   BMI 25 04 kg/m²     · CT suggestive of pancreatitis   · No leukocytosis, no transaminitis, lactic acid WNL, no acute electrolyte abnormality  · UA WNL, drug screen pending  · Likely alcoholic pancreatitis  · NPO, Continuous IVF hydration, prn zofran, pain control ordered   · AM Lipase, CBC, and CMP ordered  · GI consultation    Constipation  Assessment & Plan  · Patient reports no BM for the past 6 days, was given an enema at outpatient rehab facility without BM   Sent to ER by facility for medical clearance where pt then had a large volume bowel movement without blood  · Denies recent opioid use  · IVF hydration as above  · Bowel regimen when appropriate     Alcohol abuse  Assessment & Plan  · Patient with history of alcohol abuse  · Currently attending [facility] for rehabilitation  · Last drink three days ago with no withdrawal complaints  · Patient confident he's done drinking    Abnormal CT scan  Assessment & Plan  · CT abd w/ contrast showing:  ·  Ill-defined hypodensities in the R-lobe of the liver, MRI recommended  · Noncalcified stone in the gallbladder neck, HIDA or US recommended  · RUQ US ordered  · Outpatient liver MRI    VTE Pharmacologic Prophylaxis: VTE Score: 3 Moderate Risk (Score 3-4) - Pharmacological DVT Prophylaxis Ordered: enoxaparin (Lovenox)  Code Status: Level 1 - Full Code Level 1 Full Code  Discussion with family: Update in AM      Anticipated Length of Stay: Patient will be admitted on an inpatient basis with an anticipated length of stay of greater than 2 midnights secondary to acute pancreatitis  Total Time for Visit, including Counseling / Coordination of Care: 45 minutes Greater than 50% of this total time spent on direct patient counseling and coordination of care  Chief Complaint: constipation    History of Present Illness:  Joel Reveles is a 64 y o  male with a PMH of alcohol abuse who presents with constipation  Patient currently attending [facility] for alcohol addiction rehab  Last drink three days ago with no withdrawal complaints  Has been constipated for past 6 days, with associated epigastric abdominal pain, radiating to RLQ, nausea and two episodes of vomiting  Received an enema at the recovery center which did not produce a BM  Patient then sent to ED for medical clearance  While in the waiting room the patient had a large BM  Currently reporting epigastric abdominal pain, without nausea or other symptom  All questions answered at the bedside to the patient's satisfaction  Review of Systems:  Review of Systems   Constitutional: Negative for activity change, appetite change, chills, diaphoresis, fatigue and fever  HENT: Negative for congestion, ear pain, nosebleeds and trouble swallowing  Eyes: Negative for pain and visual disturbance     Respiratory: Negative for apnea, cough, chest tightness, shortness of breath and wheezing  Cardiovascular: Negative for chest pain, palpitations and leg swelling  Gastrointestinal: Positive for abdominal pain (epigastric, radiates to RLQ), constipation, nausea and vomiting (2 episodes at rehab center)  Negative for abdominal distention, blood in stool and diarrhea  Endocrine: Negative for cold intolerance, heat intolerance and polyuria  Genitourinary: Negative for difficulty urinating, dysuria, flank pain and hematuria  Musculoskeletal: Negative for arthralgias, neck pain and neck stiffness  Skin: Negative for color change, rash and wound  Neurological: Positive for numbness  Negative for dizziness, tremors, syncope, weakness, light-headedness and headaches  Hematological: Negative for adenopathy  All other systems reviewed and are negative  Past Medical and Surgical History:   History reviewed  No pertinent past medical history  Past Surgical History:   Procedure Laterality Date    FRACTURE SURGERY         Meds/Allergies:  Prior to Admission medications    Not on File     I have reviewed home medications with patient personally  Allergies: No Known Allergies    Social History:  Marital Status: /Civil Union   Occupation: N/A  Patient Pre-hospital Living Situation: Home  Patient Pre-hospital Level of Mobility: walks  Patient Pre-hospital Diet Restrictions: None  Substance Use History:   Social History     Substance and Sexual Activity   Alcohol Use Not Currently     Social History     Tobacco Use   Smoking Status Current Every Day Smoker    Packs/day: 1 00    Types: Cigarettes   Smokeless Tobacco Never Used     Social History     Substance and Sexual Activity   Drug Use Not Currently       Family History:  History reviewed  No pertinent family history      Physical Exam:     Vitals:   Blood Pressure: 137/84 (09/19/21 0327)  Pulse: 73 (09/19/21 0327)  Temperature: 98 °F (36 7 °C) (09/18/21 2146)  Temp Source: Tympanic (09/18/21 2146)  Respirations: 19 (09/19/21 0327)  Height: 6' 2" (188 cm) (09/18/21 2146)  Weight - Scale: 88 5 kg (195 lb) (09/18/21 2146)  SpO2: 98 % (09/19/21 0327)    Physical Exam  Vitals and nursing note reviewed  Constitutional:       General: He is not in acute distress  Appearance: Normal appearance  HENT:      Head: Normocephalic and atraumatic  Right Ear: External ear normal       Left Ear: External ear normal       Nose: Nose normal       Mouth/Throat:      Mouth: Mucous membranes are moist    Eyes:      Pupils: Pupils are equal, round, and reactive to light  Cardiovascular:      Rate and Rhythm: Normal rate and regular rhythm  Pulses: Normal pulses  Heart sounds: Normal heart sounds  No murmur heard  Pulmonary:      Effort: Pulmonary effort is normal  No respiratory distress  Breath sounds: Normal breath sounds  No wheezing or rales  Chest:      Chest wall: No tenderness  Abdominal:      General: Bowel sounds are normal  There is no distension  Palpations: Abdomen is soft  There is no mass  Tenderness: There is abdominal tenderness (Epigastric)  There is no guarding  Musculoskeletal:         General: No swelling or tenderness  Cervical back: Normal range of motion and neck supple  No rigidity or tenderness  Right lower leg: No edema  Left lower leg: No edema  Skin:     General: Skin is warm and dry  Capillary Refill: Capillary refill takes less than 2 seconds  Findings: No lesion or rash  Neurological:      General: No focal deficit present  Mental Status: He is alert  Psychiatric:         Mood and Affect: Mood normal          Behavior: Behavior normal          Thought Content:  Thought content normal           Additional Data:     Lab Results:  Results from last 7 days   Lab Units 09/19/21  0044   WBC Thousand/uL 8 91   HEMOGLOBIN g/dL 13 2   HEMATOCRIT % 38 8   PLATELETS Thousands/uL 155   NEUTROS PCT % 60   LYMPHS PCT % 27   MONOS PCT % 11   EOS PCT % 2     Results from last 7 days   Lab Units 09/19/21  0044   SODIUM mmol/L 141   POTASSIUM mmol/L 4 5   CHLORIDE mmol/L 103   CO2 mmol/L 30   BUN mg/dL 13   CREATININE mg/dL 0 97   ANION GAP mmol/L 8   CALCIUM mg/dL 8 8   ALBUMIN g/dL 3 3*   TOTAL BILIRUBIN mg/dL 0 26   ALK PHOS U/L 115   ALT U/L 39   AST U/L 29   GLUCOSE RANDOM mg/dL 104                 Results from last 7 days   Lab Units 09/19/21  0044   LACTIC ACID mmol/L 1 0       Imaging: Reviewed radiology reports from this admission including: abdominal/pelvic CT  CT abdomen pelvis with contrast   Final Result by Arslan Mcdowell MD (09/19 0357)      There is minimal fluid about the body and tail the pancreas raising the possibility of pancreatitis  Recommend correlation with laboratory results         There are ill-defined areas of hypodensity in the right lobe of the liver (2:15; 2:23) for which elective MRI abdomen with contrast, hepatic protocol, is recommended for further evaluation         There is a questionable noncalcified stone at the gallbladder neck  Borderline pericholecystic fluid  Recommend clinical correlation  Consider additional imaging such as HIDA scan or ultrasound  2 8 x 1 7 cm well-circumscribed exophytic lesion from the left kidney which does not meet CT criteria for simple cyst  Recommend elective ultrasound or MRI for further evaluation  Air-fluid level in the rectum compatible with the provided history of recent prior enema  Wall thickening of the distal sigmoid colon and rectum with loss of haustration concerning for colitis  There is diverticulosis  No evidence for diverticulitis  The study was marked in [facility] for immediate notification  Workstation performed: [id number]             EKG and Other Studies Reviewed on Admission:   · EKG: No EKG obtained  ** Please Note: This note has been constructed using a voice recognition system   **

## 2021-09-19 NOTE — ASSESSMENT & PLAN NOTE
· CT abd w/ contrast showing:  ·  Ill-defined hypodensities in the R-lobe of the liver, MRI recommended  · Noncalcified stone in the gallbladder neck, HIDA or US recommended  · RUQ US ordered  · Outpatient liver MRI

## 2021-09-19 NOTE — ASSESSMENT & PLAN NOTE
Hospital secondary to acute pancreatitis secondary to alcohol  Patient reports his last drink was 1 week ago  Improving  Currently tolerating clear liquids well  Continue IV hydration, clear liquids for now  Analgesics p r n  Advance diet as tolerated and as per GI recommendation  Counseled on alcohol cessation  GI following

## 2021-09-20 LAB
ALBUMIN SERPL BCP-MCNC: 2.7 G/DL (ref 3.5–5)
ALP SERPL-CCNC: 76 U/L (ref 46–116)
ALT SERPL W P-5'-P-CCNC: 32 U/L (ref 12–78)
ANION GAP SERPL CALCULATED.3IONS-SCNC: 8 MMOL/L (ref 4–13)
AST SERPL W P-5'-P-CCNC: 27 U/L (ref 5–45)
BILIRUB SERPL-MCNC: 0.41 MG/DL (ref 0.2–1)
BUN SERPL-MCNC: 8 MG/DL (ref 5–25)
CALCIUM ALBUM COR SERPL-MCNC: 9.8 MG/DL (ref 8.3–10.1)
CALCIUM SERPL-MCNC: 8.8 MG/DL (ref 8.3–10.1)
CHLORIDE SERPL-SCNC: 106 MMOL/L (ref 100–108)
CO2 SERPL-SCNC: 24 MMOL/L (ref 21–32)
CREAT SERPL-MCNC: 0.87 MG/DL (ref 0.6–1.3)
GFR SERPL CREATININE-BSD FRML MDRD: 96 ML/MIN/1.73SQ M
GLUCOSE SERPL-MCNC: 83 MG/DL (ref 65–140)
LIPASE SERPL-CCNC: 216 U/L (ref 73–393)
POTASSIUM SERPL-SCNC: 4.1 MMOL/L (ref 3.5–5.3)
PROT SERPL-MCNC: 6.3 G/DL (ref 6.4–8.2)
SODIUM SERPL-SCNC: 138 MMOL/L (ref 136–145)

## 2021-09-20 PROCEDURE — 99233 SBSQ HOSP IP/OBS HIGH 50: CPT | Performed by: STUDENT IN AN ORGANIZED HEALTH CARE EDUCATION/TRAINING PROGRAM

## 2021-09-20 PROCEDURE — 83690 ASSAY OF LIPASE: CPT | Performed by: FAMILY MEDICINE

## 2021-09-20 PROCEDURE — 80053 COMPREHEN METABOLIC PANEL: CPT | Performed by: FAMILY MEDICINE

## 2021-09-20 PROCEDURE — 99232 SBSQ HOSP IP/OBS MODERATE 35: CPT | Performed by: INTERNAL MEDICINE

## 2021-09-20 RX ORDER — POLYETHYLENE GLYCOL 3350 17 G/17G
17 POWDER, FOR SOLUTION ORAL ONCE
Status: COMPLETED | OUTPATIENT
Start: 2021-09-20 | End: 2021-09-20

## 2021-09-20 RX ORDER — DOCUSATE SODIUM 100 MG/1
100 CAPSULE, LIQUID FILLED ORAL 2 TIMES DAILY
Status: DISCONTINUED | OUTPATIENT
Start: 2021-09-20 | End: 2021-09-21 | Stop reason: HOSPADM

## 2021-09-20 RX ADMIN — MULTIPLE VITAMINS W/ MINERALS TAB 1 TABLET: TAB ORAL at 08:02

## 2021-09-20 RX ADMIN — POLYETHYLENE GLYCOL 3350 17 G: 17 POWDER, FOR SOLUTION ORAL at 21:06

## 2021-09-20 RX ADMIN — ACETAMINOPHEN 650 MG: 325 TABLET, FILM COATED ORAL at 08:14

## 2021-09-20 RX ADMIN — OXYCODONE HYDROCHLORIDE 10 MG: 10 TABLET ORAL at 15:19

## 2021-09-20 RX ADMIN — KETOROLAC TROMETHAMINE 15 MG: 30 INJECTION, SOLUTION INTRAMUSCULAR; INTRAVENOUS at 10:10

## 2021-09-20 RX ADMIN — ENOXAPARIN SODIUM 40 MG: 40 INJECTION SUBCUTANEOUS at 08:02

## 2021-09-20 RX ADMIN — OXYCODONE HYDROCHLORIDE 10 MG: 10 TABLET ORAL at 00:29

## 2021-09-20 RX ADMIN — SODIUM CHLORIDE 150 ML/HR: 0.9 INJECTION, SOLUTION INTRAVENOUS at 03:51

## 2021-09-20 RX ADMIN — SODIUM CHLORIDE 150 ML/HR: 0.9 INJECTION, SOLUTION INTRAVENOUS at 16:38

## 2021-09-20 RX ADMIN — KETOROLAC TROMETHAMINE 15 MG: 30 INJECTION, SOLUTION INTRAMUSCULAR; INTRAVENOUS at 21:09

## 2021-09-20 RX ADMIN — Medication 1 PATCH: at 08:02

## 2021-09-20 RX ADMIN — HYDROMORPHONE HYDROCHLORIDE 0.5 MG: 1 INJECTION, SOLUTION INTRAMUSCULAR; INTRAVENOUS; SUBCUTANEOUS at 16:36

## 2021-09-20 RX ADMIN — SODIUM CHLORIDE 150 ML/HR: 0.9 INJECTION, SOLUTION INTRAVENOUS at 23:46

## 2021-09-20 RX ADMIN — THIAMINE HCL TAB 100 MG 100 MG: 100 TAB at 08:02

## 2021-09-20 RX ADMIN — DOCUSATE SODIUM 100 MG: 100 CAPSULE, LIQUID FILLED ORAL at 21:06

## 2021-09-20 RX ADMIN — HYDROMORPHONE HYDROCHLORIDE 0.5 MG: 1 INJECTION, SOLUTION INTRAMUSCULAR; INTRAVENOUS; SUBCUTANEOUS at 03:56

## 2021-09-20 RX ADMIN — FOLIC ACID 1 MG: 1 TABLET ORAL at 08:01

## 2021-09-20 RX ADMIN — OXYCODONE HYDROCHLORIDE 10 MG: 10 TABLET ORAL at 23:46

## 2021-09-20 RX ADMIN — SODIUM CHLORIDE 150 ML/HR: 0.9 INJECTION, SOLUTION INTRAVENOUS at 10:14

## 2021-09-20 RX ADMIN — OXYCODONE HYDROCHLORIDE 10 MG: 10 TABLET ORAL at 08:02

## 2021-09-20 NOTE — PLAN OF CARE
Problem: PAIN - ADULT  Goal: Verbalizes/displays adequate comfort level or baseline comfort level  Description: Interventions:  - Encourage patient to monitor pain and request assistance  - Assess pain using appropriate pain scale  - Administer analgesics based on type and severity of pain and evaluate response  - Implement non-pharmacological measures as appropriate and evaluate response  - Consider cultural and social influences on pain and pain management  - Notify physician/advanced practitioner if interventions unsuccessful or patient reports new pain  Outcome: Progressing     Problem: INFECTION - ADULT  Goal: Absence or prevention of progression during hospitalization  Description: INTERVENTIONS:  - Assess and monitor for signs and symptoms of infection  - Monitor lab/diagnostic results  - Monitor all insertion sites, i e  indwelling lines, tubes, and drains  - Monitor endotracheal if appropriate and nasal secretions for changes in amount and color  - [place] appropriate cooling/warming therapies per order  - Administer medications as ordered  - Instruct and encourage patient and family to use good hand hygiene technique  - Identify and instruct in appropriate isolation precautions for identified infection/condition  Outcome: Progressing     Problem: DISCHARGE PLANNING  Goal: Discharge to home or other facility with appropriate resources  Description: INTERVENTIONS:  - Identify barriers to discharge w/patient and caregiver  - Arrange for needed discharge resources and transportation as appropriate  - Identify discharge learning needs (meds, wound care, etc )  - Arrange for interpretive services to assist at discharge as needed  - Refer to Case Management Department for coordinating discharge planning if the patient needs post-hospital services based on physician/advanced practitioner order or complex needs related to functional status, cognitive ability, or social support system  Outcome: Progressing Problem: GASTROINTESTINAL - ADULT  Goal: Minimal or absence of nausea and/or vomiting  Description: INTERVENTIONS:  - Administer IV fluids if ordered to ensure adequate hydration  - Maintain NPO status until nausea and vomiting are resolved  - Nasogastric tube if ordered  - Administer ordered antiemetic medications as needed  - Provide nonpharmacologic comfort measures as appropriate  - Advance diet as tolerated, if ordered  - Consider nutrition services referral to assist patient with adequate nutrition and appropriate food choices  Outcome: Progressing     Problem: Knowledge Deficit  Goal: Patient/family/caregiver demonstrates understanding of disease process, treatment plan, medications, and discharge instructions  Description: Complete learning assessment and assess knowledge base    Interventions:  - Provide teaching at level of understanding  - Provide teaching via preferred learning methods  Outcome: Progressing

## 2021-09-20 NOTE — UTILIZATION REVIEW
Inpatient Admission Authorization Request   NOTIFICATION OF INPATIENT ADMISSION/INPATIENT AUTHORIZATION REQUEST   SERVICING FACILITY:   [address] [place] [address]  Tax ID: [id number]  NPI: [id number]  Place of Service: Inpatient [address] [facility]  [address]  Place of Service Code: 24     ATTENDING PROVIDER:  Attending Name and NPI#: Miner Becky [[id number]]  Address: [address] [place] [address]  Phone: [phone]     UTILIZATION REVIEW CONTACT:  [facility] Utilization   Network Utilization Review Department  Phone: [phone]  Fax [phone]  Email: [email or web address]     PHYSICIAN ADVISORY SERVICES:  FOR [id number] REVIEW - MEDICAL NECESSITY DENIAL  Phone: [phone]  Fax: [phone]  Email: [email or web address]     TYPE OF REQUEST:  Inpatient Status     ADMISSION INFORMATION:  ADMISSION DATE/TIME: 9/19/21  4:14 AM  PATIENT DIAGNOSIS CODE/DESCRIPTION:  Alcohol abuse [F10 10]  Pancreatitis [K85 90]  Abdominal pain [R10 9]  Gallstones [K80 20]  DISCHARGE DATE/TIME: No discharge date for patient encounter  DISCHARGE DISPOSITION (IF DISCHARGED): Final discharge disposition not confirmed     IMPORTANT INFORMATION:  Please contact the [facility] directly with any questions or concerns regarding this request  Department voicemails are confidential     Send requests for admission clinical reviews, concurrent reviews, approvals, and administrative denials due to lack of clinical to fax [phone]

## 2021-09-20 NOTE — PROGRESS NOTES
[address] [facility]  Progress Note Beverly Mcdaniels 1965, 64 y o  male MRN: [id number]  Unit/Bed#: -01 Encounter: [id number]  Primary Care Provider: No primary care provider on file  Date and time admitted to hospital: 9/19/2021 12:01 AM    * Acute pancreatitis  0 [facility] secondary to acute pancreatitis secondary to alcohol  Patient reports his last drink was 1 week ago  Improving  Currently tolerating clear liquids well  Continue IV hydration, clear liquids for now  Analgesics p r n  Advance diet as tolerated and as per GI recommendation  Counseled on alcohol cessation  GI following  Constipation  Assessment & Plan  · Patient reports no BM for the past 6 days, was given an enema at outpatient rehab facility without BM  Sent to ER by facility for medical clearance where pt then had a large volume bowel movement without blood  · Denies recent opioid use  · IVF hydration as above  · Bowel regimen when appropriate     Alcohol abuse  Assessment & Plan  · Patient with history of alcohol abuse  · Currently attending [facility] for rehabilitation  · Patient reports last drink was approximately 1 week ago  · No signs of withdrawal noted this time  · Continue CIWA protocol  Abnormal CT scan  Assessment & Plan  · CT abd w/ contrast showing:  ·  Ill-defined hypodensities in the R-lobe of the liver, MRI recommended  · Noncalcified stone in the gallbladder neck  · MRI report noted with moderate hepatomegaly, hemangioma, small cyst   · Right upper quadrant also report noted with distended gallbladder sludge, no evidence of cholelithiasis, cholecystitis, CBD dilation  · GI following        VTE Pharmacologic Prophylaxis: VTE Score: 3 Moderate Risk (Score 3-4) - Pharmacological DVT Prophylaxis Ordered: enoxaparin (Lovenox)  Patient Centered Rounds: I performed bedside rounds with nursing staff today    Discussions with Specialists or Other Care Team Provider:  GI    Education and Discussions with Family / Patient: Updated  (wife) via phone  Time Spent for Care: 30 minutes  More than 50% of total time spent on counseling and coordination of care as described above  Current Length of Stay: 1 day(s)  Current Patient Status: Inpatient   Certification Statement: The patient will continue to require additional inpatient hospital stay due to Acute pancreatitis  Discharge Plan: Anticipate discharge in 48 hrs to home  Code Status: Level 1 - Full Code    Subjective:   complaining of persistent abdominal pain, some nausea however tolerated clear liquids well  Denies chest pain, fever, chills, dysuria, diarrhea  Denies any complaints this time  No other events reported  Objective:     Vitals:   Temp (24hrs), Av °F (36 7 °C), Min:97 8 °F (36 6 °C), Max:98 2 °F (36 8 °C)    Temp:  [97 8 °F (36 6 °C)-98 2 °F (36 8 °C)] 97 8 °F (36 6 °C)  HR:  [58-63] 59  Resp:  [13-19] 13  BP: (125-130)/(76-90) 125/86  SpO2:  [95 %-98 %] 97 %  Body mass index is 25 04 kg/m²  Input and Output Summary (last 24 hours): Intake/Output Summary (Last 24 hours) at 2021 1737  Last data filed at 2021 1638  Gross per 24 hour   Intake 1980 ml   Output 3075 ml   Net -1095 ml       Physical Exam:   Physical Exam  Constitutional:       General: He is not in acute distress  Appearance: Normal appearance  He is not ill-appearing  HENT:      Head: Normocephalic and atraumatic  Cardiovascular:      Rate and Rhythm: Normal rate and regular rhythm  Pulses: Normal pulses  Heart sounds: Normal heart sounds  Pulmonary:      Effort: Pulmonary effort is normal  No respiratory distress  Breath sounds: Normal breath sounds  No stridor  No wheezing or rhonchi  Abdominal:      General: Bowel sounds are normal  There is no distension  Palpations: Abdomen is soft  Tenderness:  There is abdominal tenderness (Mild diffuse tenderness noted on exam, no rebound, no rigidity)  There is no right CVA tenderness, left CVA tenderness, guarding or rebound  Musculoskeletal:      Cervical back: Normal range of motion and neck supple  No rigidity  Skin:     Findings: No rash  Neurological:      General: No focal deficit present  Mental Status: He is alert and oriented to person, place, and time  Mental status is at baseline     Psychiatric:         Mood and Affect: Mood normal          Behavior: Behavior normal           Additional Data:     Labs:  Results from last 7 days   Lab Units 09/19/21  0044   WBC Thousand/uL 8 91   HEMOGLOBIN g/dL 13 2   HEMATOCRIT % 38 8   PLATELETS Thousands/uL 155   NEUTROS PCT % 60   LYMPHS PCT % 27   MONOS PCT % 11   EOS PCT % 2     Results from last 7 days   Lab Units 09/20/21  0447   SODIUM mmol/L 138   POTASSIUM mmol/L 4 1   CHLORIDE mmol/L 106   CO2 mmol/L 24   BUN mg/dL 8   CREATININE mg/dL 0 87   ANION GAP mmol/L 8   CALCIUM mg/dL 8 8   ALBUMIN g/dL 2 7*   TOTAL BILIRUBIN mg/dL 0 41   ALK PHOS U/L 76   ALT U/L 32   AST U/L 27   GLUCOSE RANDOM mg/dL 83                 Results from last 7 days   Lab Units 09/19/21  0044   LACTIC ACID mmol/L 1 0       Lines/Drains:  Invasive Devices     Peripheral Intravenous Line            Peripheral IV 09/19/21 Right Antecubital 1 day                      Imaging: Reviewed radiology reports from this admission including: MRI abdomen/MRCP    Recent Cultures (last 7 days):         Last 24 Hours Medication List:   Current Facility-Administered Medications   Medication Dose Route Frequency Provider Last Rate    acetaminophen  650 mg Oral Q6H PRN Beau Dye PA-C      enoxaparin  40 mg Subcutaneous Daily Tobi Geronimo      folic acid  1 mg Oral Daily Richard Ramirez MD      HYDROmorphone  0 5 mg Intravenous Q6H PRN [facility]      ketorolac  15 mg Intravenous Q6H PRN Beau Dye PA-C      multivitamin-minerals  1 tablet Oral Daily MD Amirah Chisholm nicotine  1 patch Transdermal Daily [place], [place]      ondansetron  4 mg Intravenous Q6H PRN [place], [place]      oxyCODONE  10 mg Oral Q4H PRN [place], [place]      sodium chloride  150 mL/hr Intravenous Continuous Arloa Mary Carmen,  mL/hr (09/20/21 1638)    thiamine  100 mg Oral Daily Anupama Martin MD          Today, Patient Was Seen By: Miesha Mcclain MD    **Please Note: This note may have been constructed using a voice recognition system  **

## 2021-09-20 NOTE — UTILIZATION REVIEW
Initial Clinical Review    Admission: Date/Time/Statement:   Admission Orders (From admission, onward)     Ordered        09/19/21 0414  Inpatient Admission  Once                   Orders Placed This Encounter   Procedures    Inpatient Admission     Standing Status:   Standing     Number of Occurrences:   1     Order Specific Question:   Level of Care     Answer:   Med Surg [16]     Order Specific Question:   Estimated length of stay     Answer:   More than 2 Midnights     Order Specific Question:   Certification     Answer:   I certify that inpatient services are medically necessary for this patient for a duration of greater than two midnights  See H&P and MD Progress Notes for additional information about the patient's course of treatment  ED Arrival Information     Expected Arrival Acuity    - 9/18/2021 21:14 Urgent         Means of arrival Escorted by Service Admission type    Walk-In Self Hospitalist Urgent         Arrival complaint    constipation/abd pain        Chief Complaint   Patient presents with    Abdominal Pain     Patient co abdominal pain  Patient reports no bowel movement time 6 days  Patient reports taking enema with no relief  Initial Presentation: 63 yo male to ED from home w/ constipation , PMHX alcohol abuse  Currently attending [facility] for alcohol addiction rehab   Last drink 3 days ago , no withdraw c/o   Constipated for the past 6 days , assoc epigastric abd pain, radiating to RLQ , nausea and episodes of vomiting   CT suggestive of pancreatitis   Lipase 1162  Admitted IP status w/ acute pancreatis , plan for NPO , IVF , prn zofran , pain control , recheck labs in am   [address] [facility]   PE : abd tenderness     9/19 GI Consult   Acute pancreatitis , alcohol induced  Second pancreatitis , liver enzymes   Cont following lipase levels  NPO , pain mngt , antiemetics , IVF , monitor labs , cmp , cbc   F/u MRI , abnormal CT of liver  Date: 9/20  Day 2: Lipase improved   Cont IVF and pain control   GI following   Gyae Martinez Plan to adv to clear liq diet     ED Triage Vitals   Temperature Pulse Respirations Blood Pressure SpO2   09/18/21 2146 09/18/21 2146 09/18/21 2146 09/18/21 2146 09/18/21 2146   98 °F (36 7 °C) 101 19 (!) 180/103 100 %      Temp Source Heart Rate Source Patient Position - Orthostatic VS BP Location FiO2 (%)   09/18/21 2146 09/18/21 2146 -- 09/18/21 2146 --   Tympanic Monitor  Right arm       Pain Score       09/19/21 0253       8          Wt Readings from Last 1 Encounters:   09/18/21 88 5 kg (195 lb)     Additional Vital Signs:   09/20/21 0721  98 1 °F (36 7 °C)  58  19  130/86  101  95 %  --   09/19/21 22:30:40  98 2 °F (36 8 °C)  60  16  128/76  93  98 %  --   09/19/21 1848  --  --  --  --  --  --  None (Room air)   09/19/21 18:35:09  97 9 °F (36 6 °C)  63  17  127/90  102  98 %  --   09/19/21 0327  --  73  19  137/84  --  98 %  None (Room air)   09/19/21 0048  --  --  --  --  --  --         Pertinent Labs/Diagnostic Test Results:   9/19 CT abd There is minimal fluid about the body and tail the pancreas raising the possibility of pancreatitis  Recommend correlation with laboratory results      There are ill-defined areas of hypodensity in the right lobe of the liver (2:15; 2:23) for which elective MRI abdomen with contrast, hepatic protocol, is recommended for further evaluation      There is a questionable noncalcified stone at the gallbladder neck  Borderline pericholecystic fluid  Recommend clinical correlation  Consider additional imaging such as HIDA scan or ultrasound    2 8 x 1 7 cm well-circumscribed exophytic lesion from the left kidney which does not meet CT criteria for simple cyst  Recommend elective ultrasound or MRI for further evaluation    Air-fluid level in the rectum compatible with the provided history of recent prior enema   Wall thickening of the distal sigmoid colon and rectum with loss of haustration concerning for colitis    There is diverticulosis  No evidence for diverticulitis    9/19 MRI abd Moderate hepatomegaly   Lupe Nagel is a small hemangioma and small simple cyst in the liver  There are no suspicious liver lesions    The indeterminate exophytic left renal upper pole lesion seen on prior CT is shown to be a 2 7 x 1 7 cm hemorrhagic cyst (series 10,013, image 314 )   9/19 US RUQ Distended gallbladder with sludge  There is no evidence of cholelithiasis or acute cholecystitis    There is also no evidence of biliary ductal dilatation    Moderate hepatomegaly  Results from last 7 days   Lab Units 09/19/21  0044   WBC Thousand/uL 8 91   HEMOGLOBIN g/dL 13 2   HEMATOCRIT % 38 8   PLATELETS Thousands/uL 155   NEUTROS ABS Thousands/µL 5 35     Results from last 7 days   Lab Units 09/20/21  0447 09/19/21  0044   SODIUM mmol/L 138 141   POTASSIUM mmol/L 4 1 4 5   CHLORIDE mmol/L 106 103   CO2 mmol/L 24 30   ANION GAP mmol/L 8 8   BUN mg/dL 8 13   CREATININE mg/dL 0 87 0 97   EGFR ml/min/1 73sq m 96 87   CALCIUM mg/dL 8 8 8 8     Results from last 7 days   Lab Units 09/20/21  0447 09/19/21  0044   AST U/L 27 29   ALT U/L 32 39   ALK PHOS U/L 76 115   TOTAL PROTEIN g/dL 6 3* 7 0   ALBUMIN g/dL 2 7* 3 3*   TOTAL BILIRUBIN mg/dL 0 41 0 26     Results from last 7 days   Lab Units 09/20/21  0447 09/19/21  0044   GLUCOSE RANDOM mg/dL 83 104       Results from last 7 days   Lab Units 09/19/21  0044   LACTIC ACID mmol/L 1 0     Results from last 7 days   Lab Units 09/20/21  0447 09/19/21  0044   LIPASE u/L 216 1,162*     Results from last 7 days   Lab Units 09/19/21  0039   CLARITY UA  Clear   COLOR UA  Yellow   SPEC GRAV UA  1 025   PH UA  6 0   GLUCOSE UA mg/dl Negative   KETONES UA mg/dl Negative   BLOOD UA  Negative   PROTEIN UA mg/dl Negative   NITRITE UA  Negative   BILIRUBIN UA  Negative   UROBILINOGEN UA E U /dl 0 2   LEUKOCYTES UA  Negative     Results from last 7 days   Lab Units 09/19/21  0039   AMPH/METH  Negative   BARBITURATE UR  Negative BENZODIAZEPINE UR  Positive*   COCAINE UR  Negative   METHADONE URINE  Negative   OPIATE UR  Negative   PCP UR  Negative   THC UR  Negative     ED Treatment:   Medication Administration from 09/18/2021 2114 to 09/19/2021 1824       Date/Time Order Dose Route Action     09/19/2021 0254 sodium chloride 0 9 % bolus 1,000 mL 1,000 mL Intravenous New Bag     09/19/2021 0253 morphine (PF) 4 mg/mL injection 4 mg 4 mg Intravenous Given     09/19/2021 0254 ondansetron (ZOFRAN) injection 4 mg 4 mg Intravenous Given     09/19/2021 0921 sodium chloride 0 9 % infusion 150 mL/hr Intravenous Rate/Dose Change     09/19/2021 0538 sodium chloride 0 9 % infusion 100 mL/hr Intravenous New Bag     09/19/2021 0849 enoxaparin (LOVENOX) subcutaneous injection 40 mg 40 mg Subcutaneous Given     09/19/2021 1808 oxyCODONE (ROXICODONE) immediate release tablet 10 mg 10 mg Oral Given     09/19/2021 1357 oxyCODONE (ROXICODONE) immediate release tablet 10 mg 10 mg Oral Given     09/19/2021 0849 oxyCODONE (ROXICODONE) immediate release tablet 10 mg 10 mg Oral Given     09/19/2021 0539 HYDROmorphone (DILAUDID) injection 0 5 mg 0 5 mg Intravenous Given     09/19/2021 0550 nicotine (NICODERM CQ) 21 mg/24 hr TD 24 hr patch 1 patch 1 patch Transdermal Medication Applied     09/19/2021 1014 pantoprazole (PROTONIX) injection 40 mg 40 mg Intravenous Given     09/19/2021 1013 thiamine tablet 100 mg 100 mg Oral Given     20/26/2714 9450 folic acid (FOLVITE) tablet 1 mg 1 mg Oral Given     09/19/2021 1013 multivitamin-minerals (CENTRUM) tablet 1 tablet 1 tablet Oral Given     09/19/2021 1646 Gadobutrol injection (SINGLE-DOSE) SOLN 8 mL 8 mL Intravenous Given        History reviewed  No pertinent past medical history    Present on Admission:   Acute pancreatitis   Alcohol abuse   Constipation      Admitting Diagnosis: Alcohol abuse [F10 10]  Pancreatitis [K85 90]  Abdominal pain [R10 9]  Gallstones [K80 20]  Age/Sex: 64 y o  male  Admission Orders:  Scheduled Medications:  enoxaparin, 40 mg, Subcutaneous, Daily  folic acid, 1 mg, Oral, Daily  multivitamin-minerals, 1 tablet, Oral, Daily  nicotine, 1 patch, Transdermal, Daily  thiamine, 100 mg, Oral, Daily      Continuous IV Infusions:  sodium chloride, 150 mL/hr, Intravenous, Continuous      PRN Meds:  acetaminophen, 650 mg, Oral, Q6H PRN  HYDROmorphone, 0 5 mg, Intravenous, Q6H PRN  9/19  x1  9/20  x1  ketorolac, 15 mg, Intravenous, Q6H PRN 9/20  x1  ondansetron, 4 mg, Intravenous, Q6H PRN  oxyCODONE, 10 mg, Oral, Q4H PRN        IP CONSULT TO GASTROENTEROLOGY    Network Utilization Review Department  ATTENTION: Please call with any questions or concerns to [phone] and carefully listen to the prompts so that you are directed to the right person  All voicemails are confidential   Deondre Shaper all requests for admission clinical reviews, approved or denied determinations and any other requests to dedicated fax number below belonging to the campus where the patient is receiving treatment   List of dedicated fax numbers for the Facilities:  [address] DENIALS (Administrative/Medical Necessity) [phone]   [address] (Maternity/NICU/Pediatrics) [phone]   [address] [place] [address] Dr Lilia Cowan [address] [address] [place] [address] Jackelin Stevens [address] [facility] [phone]

## 2021-09-20 NOTE — PROGRESS NOTES
Progress Note - Joel Reveles 64 y o  male MRN: [id number]    Unit/Bed#: -01 Encounter: [id number]        Subjective:     Patient reports he is finally feeling hungry  His abdominal pain is improved as compared to admission he reports  No other complaints at this time  We had an extensive conversation regarding his plans for abstaining from alcohol and potential risks if he were to continue drinking  ROS: As noted in the HPI, otherwise all others negative  Objective:     Vitals: Blood pressure 130/86, pulse 58, temperature 98 1 °F (36 7 °C), resp  rate 19, height 6' 2" (1 88 m), weight 88 5 kg (195 lb), SpO2 95 %  ,Body mass index is 25 04 kg/m²  Intake/Output Summary (Last 24 hours) at 9/20/2021 0936  Last data filed at 9/20/2021 0351  Gross per 24 hour   Intake 0 ml   Output 850 ml   Net -850 ml       Physical Exam:     General Appearance: Alert and oriented x 3  In no respiratory distress  Lungs: Clear to auscultation bilaterally, no rales or rhonchi  Heart: Regular rate and rhythm, S1, S2 normal, no murmur, click, rub or gallop  Abdomen: Soft, non-tender, non-distended; bowel sounds normal; no masses or no organomegaly  Extremities: No cyanosis, edema    Invasive Devices     Peripheral Intravenous Line            Peripheral IV 09/19/21 Right Antecubital 1 day                Lab Results:  Results from last 7 days   Lab Units 09/19/21  0044   WBC Thousand/uL 8 91   HEMOGLOBIN g/dL 13 2   HEMATOCRIT % 38 8   PLATELETS Thousands/uL 155   NEUTROS PCT % 60   LYMPHS PCT % 27   MONOS PCT % 11   EOS PCT % 2     Results from last 7 days   Lab Units 09/20/21  0447   POTASSIUM mmol/L 4 1   CHLORIDE mmol/L 106   CO2 mmol/L 24   BUN mg/dL 8   CREATININE mg/dL 0 87   CALCIUM mg/dL 8 8   ALK PHOS U/L 76   ALT U/L 32   AST U/L 27         Results from last 7 days   Lab Units 09/20/21  0447   LIPASE u/L 216       Imaging Studies: I have personally reviewed pertinent imaging studies      MRI abdomen w wo contrast and mrcp    Result Date: 9/20/2021  Impression: Moderate hepatomegaly  There is a small hemangioma and small simple cyst in the liver  There are no suspicious liver lesions  The indeterminate exophytic left renal upper pole lesion seen on prior CT is shown to be a 2 7 x 1 7 cm hemorrhagic cyst (series 10,013, image 314 ) Workstation performed: [id number]     US right upper quadrant    Result Date: 9/20/2021  Impression: Distended gallbladder with sludge  There is no evidence of cholelithiasis or acute cholecystitis  There is also no evidence of biliary ductal dilatation  Moderate hepatomegaly  Workstation performed: [id number]     CT abdomen pelvis with contrast    Result Date: 9/19/2021  Impression: There is minimal fluid about the body and tail the pancreas raising the possibility of pancreatitis  Recommend correlation with laboratory results    There are ill-defined areas of hypodensity in the right lobe of the liver (2:15; 2:23) for which elective MRI abdomen with contrast, hepatic protocol, is recommended for further evaluation    There is a questionable noncalcified stone at the gallbladder neck  Borderline pericholecystic fluid  Recommend clinical correlation  Consider additional imaging such as HIDA scan or ultrasound  2 8 x 1 7 cm well-circumscribed exophytic lesion from the left kidney which does not meet CT criteria for simple cyst  Recommend elective ultrasound or MRI for further evaluation  Air-fluid level in the rectum compatible with the provided history of recent prior enema  Wall thickening of the distal sigmoid colon and rectum with loss of haustration concerning for colitis  There is diverticulosis  No evidence for diverticulitis  The study was marked in [facility] for immediate notification  Workstation performed: [id number]         Assessment and Plan:     1) Acute alcoholic pancreatitis - Patient reports his last drink was on Tuesday or Wednesday prior to going to rehab    Patient reports that this is his 2nd episode of pancreatitis in 2 years  He presented with a lipase above 1000 with CT scan findings consistent with possible pancreatitis  MRI of the abdomen was performed, and pancreas appeared to be normal   The gallbladder did demonstrate sludge  Fortunately, patient's abdominal pain is improving  We went over the risks of continued alcohol abuse from a pancreatic and liver standpoint   - Will introduce clear liquid diet today as tolerated  - Continue  mL/hr until patient is consistently taking in liquids  - Incentive spirometry  - Complete abstinence from alcohol    2) Gallbladder sludge - Fortunately, no elevation in LFTs  Patient reports that he has been experiencing right-sided abdominal pain as an outpatient intermittently that radiates to the back  - Abstinence from alcohol  - Continue to monitor LFTs  - If persistent RUQ pain, can be evaluated for cholecystectomy as an outpatient   He is from [place]

## 2021-09-20 NOTE — CASE MANAGEMENT
Case Management Assessment & Discharge Planning Note    Patient name Georgie Herrera  Location [address] [phone] MRN [id number]  : 1965 Date 2021       Current Admission Date: 2021  Current Admission Diagnosis:  Acute pancreatitis  Previous Admission - Discharge Date:    LOS (days): 1  Geometric Mean LOS (GMLOS) (days):   Days to GMLOS: Previous Discharge Diagnosis:  No discharge information exists for this patient  OBJECTIVE:    Risk of Unplanned Readmission Score: 5   Bundle(if applicable): Bundled Patient Payment:  (NO)  Current admission status: Inpatient       Preferred Pharmacy: No Pharmacies Listed  Primary Care Provider: No primary care provider on file  Primary Insurance: [employer]  Secondary Insurance:     ASSESSMENT:  [address], [address] [facility] Representative - Spouse   Primary Phone: [phone] (Mobile)  Home Phone: [phone]               Advance Directives  Does patient have a [address]?: No  Was patient offered paperwork?: Yes (declined)  Does patient currently have a Health Care decision maker?: Yes, please see Health Care Proxy section  Does patient have Advance Directives?: No  Was patient offered paperwork?: Yes (declined)              Readmission Root Cause  30 Day Readmission: No    Patient Information  Mental Status: Alert  During Assessment patient was accompanied by: Not accompanied during assessment  Assessment information provided by[de-identified] Patient  Primary Caregiver: Self  Support Systems: Self, Spouse/significant other, Other- Comment Ex  Muslim, community, neighbor, etc  (his spiritual group)  [place] of Residence:  ([facility]  What city do you live in?: [address] [facility] entry access options   Select all that apply : Stairs  Number of steps to enter home : One Flight  Do the steps have railings?: Yes  Type of Current Residence: [address] [place] home  Upon entering residence, is there a bedroom on the main floor (no further steps)?: No  A bedroom is located on the following floor levels of residence (select all that apply):: 2nd [address] [facility] which floors of current residence have a bathroom (select all the apply):: 2nd Floor  Number of steps to 2nd floor from main floor: One Flight  Living Arrangements: Lives w/ Spouse/significant other, Lives w/ Son, Lives w/ Daughter  Is patient a ?: No    Activities of Daily Living Prior to Admission  Functional Status: Independent  Completes ADLs independently?: Yes  Ambulates independently?: Yes  Does patient use assisted devices?: No  Does patient currently own DME?: No  Does patient have a history of Outpatient Therapy (PT/OT)?: No  Does the patient have a history of Short-Term Rehab?: No  [facility] No         Patient Information Continued  Income Source: Employed (construction)  Does patient have prescription coverage?: No (does not take meds,  uses natural medicine and meditation)  Does patient receive dialysis treatments?: No  Does patient have a history of substance abuse?: Yes, Currently using  Current substance use preference: Alcohol/ETOH  Historical substance use preference: Alcohol/ETOH  History of Withdrawal Symptoms: Denies past symptoms  Is patient currently in treatment for substance abuse?: Yes (currently at [address] [place] [address])  Does patient have a history of Mental Health Diagnosis?: Yes (bipolar disease)  Is patient receiving treatment for mental health?: Yes (patient uses natural meds and spiritual meditation)  Has patient received inpatient treatment related to mental health in the last 2 years?: No (patient was admitted to [facility] unit for drugs and Bipolar, unknown date " along time ago")         Means of Transportation  Means of Transport to Appts[de-identified] Drives Self  In the past 12 months, has lack of transportation kept you from medical appointments or from getting medications?: No  In the past 12 months, has lack of transportation kept you from meetings, work, or from getting things needed for daily living?: No  Was application for public transport provided?: No    DISCHARGE DETAILS:    Discharge planning discussed with[de-identified] patient  Freedom of Choice: Yes         [address] [place] [address] Road         Is the [place] interested in [place] [address] at discharge?: No    DME Referral Provided  Referral made for DME?: No    Other Referral/Resources/Interventions Provided:  Interventions: Substance Abuse Treatment (return to [facility] rehab at discharge)         Discharge Destination Plan[de-identified] Facility Return ([address])                                              [address] : [address] [facility]  Receiving Facility/Agency Phone Number: [phone]  Facility/Agency Fax Number: [phone]

## 2021-09-21 LAB
ANION GAP SERPL CALCULATED.3IONS-SCNC: 7 MMOL/L (ref 4–13)
BUN SERPL-MCNC: 7 MG/DL (ref 5–25)
CALCIUM SERPL-MCNC: 8 MG/DL (ref 8.3–10.1)
CHLORIDE SERPL-SCNC: 106 MMOL/L (ref 100–108)
CO2 SERPL-SCNC: 24 MMOL/L (ref 21–32)
CREAT SERPL-MCNC: 0.83 MG/DL (ref 0.6–1.3)
ERYTHROCYTE [DISTWIDTH] IN BLOOD BY AUTOMATED COUNT: 11.7 % (ref 11.6–15.1)
GFR SERPL CREATININE-BSD FRML MDRD: 98 ML/MIN/1.73SQ M
GLUCOSE SERPL-MCNC: 83 MG/DL (ref 65–140)
HCT VFR BLD AUTO: 36.1 % (ref 36.5–49.3)
HGB BLD-MCNC: 12.1 G/DL (ref 12–17)
MCH RBC QN AUTO: 33.6 PG (ref 26.8–34.3)
MCHC RBC AUTO-ENTMCNC: 33.5 G/DL (ref 31.4–37.4)
MCV RBC AUTO: 100 FL (ref 82–98)
PLATELET # BLD AUTO: 161 THOUSANDS/UL (ref 149–390)
PMV BLD AUTO: 10.3 FL (ref 8.9–12.7)
POTASSIUM SERPL-SCNC: 3.9 MMOL/L (ref 3.5–5.3)
RBC # BLD AUTO: 3.6 MILLION/UL (ref 3.88–5.62)
SODIUM SERPL-SCNC: 137 MMOL/L (ref 136–145)
WBC # BLD AUTO: 5.47 THOUSAND/UL (ref 4.31–10.16)

## 2021-09-21 PROCEDURE — 85027 COMPLETE CBC AUTOMATED: CPT | Performed by: STUDENT IN AN ORGANIZED HEALTH CARE EDUCATION/TRAINING PROGRAM

## 2021-09-21 PROCEDURE — 80048 BASIC METABOLIC PNL TOTAL CA: CPT | Performed by: STUDENT IN AN ORGANIZED HEALTH CARE EDUCATION/TRAINING PROGRAM

## 2021-09-21 PROCEDURE — 99239 HOSP IP/OBS DSCHRG MGMT >30: CPT | Performed by: FAMILY MEDICINE

## 2021-09-21 PROCEDURE — 99232 SBSQ HOSP IP/OBS MODERATE 35: CPT | Performed by: INTERNAL MEDICINE

## 2021-09-21 RX ORDER — ACETAMINOPHEN 325 MG/1
650 TABLET ORAL EVERY 6 HOURS PRN
Refills: 0
Start: 2021-09-21

## 2021-09-21 RX ADMIN — OXYCODONE HYDROCHLORIDE 10 MG: 10 TABLET ORAL at 14:43

## 2021-09-21 RX ADMIN — SODIUM CHLORIDE 150 ML/HR: 0.9 INJECTION, SOLUTION INTRAVENOUS at 06:21

## 2021-09-21 RX ADMIN — FOLIC ACID 1 MG: 1 TABLET ORAL at 09:23

## 2021-09-21 RX ADMIN — MULTIPLE VITAMINS W/ MINERALS TAB 1 TABLET: TAB ORAL at 09:23

## 2021-09-21 RX ADMIN — ACETAMINOPHEN 650 MG: 325 TABLET, FILM COATED ORAL at 05:32

## 2021-09-21 RX ADMIN — Medication 1 PATCH: at 09:26

## 2021-09-21 RX ADMIN — OXYCODONE HYDROCHLORIDE 10 MG: 10 TABLET ORAL at 09:23

## 2021-09-21 RX ADMIN — THIAMINE HCL TAB 100 MG 100 MG: 100 TAB at 09:23

## 2021-09-21 RX ADMIN — OXYCODONE HYDROCHLORIDE 10 MG: 10 TABLET ORAL at 04:31

## 2021-09-21 RX ADMIN — DOCUSATE SODIUM 100 MG: 100 CAPSULE, LIQUID FILLED ORAL at 09:23

## 2021-09-21 NOTE — PROGRESS NOTES
Progress Note - Fide Marti 64 y o  male MRN: [id number]    Unit/Bed#: -01 Encounter: [id number]        Subjective:     Patient reports that he is eager to eat and did very well with CLD  He ordered two trays last night for dinner  No complaints at this time  ROS: As noted in the HPI, otherwise all others negative  Objective:     Vitals: Blood pressure 142/96, pulse 61, temperature 98 2 °F (36 8 °C), resp  rate 18, height 6' 2" (1 88 m), weight 88 5 kg (195 lb), SpO2 97 %  ,Body mass index is 25 04 kg/m²  Intake/Output Summary (Last 24 hours) at 9/21/2021 0935  Last data filed at 9/21/2021 0723  Gross per 24 hour   Intake 4407 5 ml   Output 5875 ml   Net -1467 5 ml       Physical Exam:     General Appearance: Alert and oriented x 3  In no respiratory distress  Lungs: Clear to auscultation bilaterally, no rales or rhonchi  Heart: Regular rate and rhythm, S1, S2 normal, no murmur, click, rub or gallop  Abdomen: Soft, non-tender, non-distended; bowel sounds normal; no masses or no organomegaly  Extremities: No cyanosis, edema    Invasive Devices     Peripheral Intravenous Line            Peripheral IV 09/19/21 Right Antecubital 2 days                Lab Results:  Results from last 7 days   Lab Units 09/21/21  0459 09/19/21  0044   WBC Thousand/uL 5 47 8 91   HEMOGLOBIN g/dL 12 1 13 2   HEMATOCRIT % 36 1* 38 8   PLATELETS Thousands/uL 161 155   NEUTROS PCT %  --  60   LYMPHS PCT %  --  27   MONOS PCT %  --  11   EOS PCT %  --  2     Results from last 7 days   Lab Units 09/21/21  0459 09/20/21  0447   POTASSIUM mmol/L 3 9 4 1   CHLORIDE mmol/L 106 106   CO2 mmol/L 24 24   BUN mg/dL 7 8   CREATININE mg/dL 0 83 0 87   CALCIUM mg/dL 8 0* 8 8   ALK PHOS U/L  --  76   ALT U/L  --  32   AST U/L  --  27         Results from last 7 days   Lab Units 09/20/21  0447   LIPASE u/L 216       Imaging Studies: I have personally reviewed pertinent imaging studies      MRI abdomen w wo contrast and mrcp    Result Date: 9/20/2021  Impression: Moderate hepatomegaly  There is a small hemangioma and small simple cyst in the liver  There are no suspicious liver lesions  The indeterminate exophytic left renal upper pole lesion seen on prior CT is shown to be a 2 7 x 1 7 cm hemorrhagic cyst (series 10,013, image 314 ) Workstation performed: [id number]     US right upper quadrant    Result Date: 9/20/2021  Impression: Distended gallbladder with sludge  There is no evidence of cholelithiasis or acute cholecystitis  There is also no evidence of biliary ductal dilatation  Moderate hepatomegaly  Workstation performed: [id number]     CT abdomen pelvis with contrast    Result Date: 9/19/2021  Impression: There is minimal fluid about the body and tail the pancreas raising the possibility of pancreatitis  Recommend correlation with laboratory results    There are ill-defined areas of hypodensity in the right lobe of the liver (2:15; 2:23) for which elective MRI abdomen with contrast, hepatic protocol, is recommended for further evaluation    There is a questionable noncalcified stone at the gallbladder neck  Borderline pericholecystic fluid  Recommend clinical correlation  Consider additional imaging such as HIDA scan or ultrasound  2 8 x 1 7 cm well-circumscribed exophytic lesion from the left kidney which does not meet CT criteria for simple cyst  Recommend elective ultrasound or MRI for further evaluation  Air-fluid level in the rectum compatible with the provided history of recent prior enema  Wall thickening of the distal sigmoid colon and rectum with loss of haustration concerning for colitis  There is diverticulosis  No evidence for diverticulitis  The study was marked in [place] [address] for immediate notification  Workstation performed: [id number]         Assessment and Plan:        1) Acute alcoholic pancreatitis - Patient reports his last drink was on Tuesday or Wednesday prior to going to rehab    Patient reports that this is his 2nd episode of pancreatitis in 2 years  He presented with a lipase above 1000 with CT scan findings consistent with possible pancreatitis  MRI of the abdomen was performed, and pancreas appeared to be normal   The gallbladder did demonstrate sludge  Fortunately, patient's abdominal pain is improving  We went over the risks of continued alcohol abuse from a pancreatic and liver standpoint   - Ok to discontinue IVF since he is consistently taking in clear liquids   - Advance to low fat diet   - Incentive spirometry  - Complete abstinence from alcohol     2) Gallbladder sludge - Fortunately, no elevation in LFTs  Patient reports that he has been experiencing right-sided abdominal pain as an outpatient intermittently that radiates to the back  - Abstinence from alcohol  - Continue to monitor LFTs  - If persistent RUQ pain, can be evaluated for cholecystectomy as an outpatient   He is from [facility] [address] [facility] [address]

## 2021-09-21 NOTE — ASSESSMENT & PLAN NOTE
· CT abd w/ contrast showing:  ·  Ill-defined hypodensities in the R-lobe of the liver, MRI reviewed  · Noncalcified stone in the gallbladder neck  · MRI report noted with moderate hepatomegaly, hemangioma, small cyst   · Right upper quadrant also report noted with distended gallbladder sludge, no evidence of cholelithiasis, cholecystitis, CBD dilation    · GI following

## 2021-09-21 NOTE — DISCHARGE SUMMARY
[facility]  Discharge- Cheryle Betters 1965, 64 y o  male MRN: [id number]  Unit/Bed#: -01 Encounter: [id number]  Primary Care Provider: Johnathon Blount MD   Date and time admitted to hospital: 9/19/2021 12:01 AM    Alcohol abuse  Assessment & Plan  · Patient with history of alcohol abuse  · Currently attending [facility] for rehabilitation  · Patient reports last drink was approximately 1 week ago  · No signs of withdrawal noted this time  · Continue CIWA protocol  Abnormal CT scan  Assessment & Plan  · CT abd w/ contrast showing:  ·  Ill-defined hypodensities in the R-lobe of the liver, MRI reviewed  · Noncalcified stone in the gallbladder neck  · MRI report noted with moderate hepatomegaly, hemangioma, small cyst   · Right upper quadrant also report noted with distended gallbladder sludge, no evidence of cholelithiasis, cholecystitis, CBD dilation  · GI following    * Acute pancreatitis  Assessment & Plan  Improved  Tolerating diet  Cleared by GI for discharge  Strict alcohol abstinence recommended           Medical Problems     Resolved Problems  Date Reviewed: 9/19/2021    None              Discharging Physician / Practitioner: Judy Amado MD  PCP: Johnathon Blount MD  Admission Date:   Admission Orders (From admission, onward)     Ordered        09/19/21 0414  Inpatient Admission  Once                   Discharge Date: 09/21/21    Consultations During Hospital Stay:  · IP CONSULT TO GASTROENTEROLOGY  ·     Procedures Performed:   · None    Significant Findings / Test Results:   · Pancreatitis    Incidental Findings:   · None     Test Results Pending at Discharge (will require follow up): · None     Outpatient Tests Requested:  · None    Complications:  None    Reason for Admission:  Abdominal pain    Hospital Course:   Cheryle Betters is a 64 y o  male patient who originally presented to the hospital on 9/19/2021 due to acute pancreatitis from alcohol    MRI was done which showed hemangioma  MRI was done secondary to abnormal ultrasound finding  Patient was treated with IV fluids and pain control  Diet has been slowly advanced and is now tolerating low-fat diet  Cleared for discharge from GI perspective  Strict alcohol abstinence recommendations and counseling has been done      Please see above list of diagnoses and related plan for additional information  Condition at Discharge: stable    Discharge Day Visit / Exam:   Subjective:  I am fine  Vitals: Blood Pressure: 142/96 (09/21/21 0723)  Pulse: 61 (09/21/21 0723)  Temperature: 98 2 °F (36 8 °C) (09/21/21 0723)  Temp Source: Tympanic (09/18/21 2146)  Respirations: 18 (09/21/21 0723)  Height: 6' 2" (188 cm) (09/18/21 2146)  Weight - Scale: 88 5 kg (195 lb) (09/18/21 2146)  SpO2: 97 % (09/21/21 0723)  Exam:   Physical Exam General- Awake, alert and oriented x 3, looks comfortable  HEENT- Normocephalic, atraumatic, oral mucosa- moist  Neck- Supple, No carotid bruit, no JVD  CVS- Normal S1/ S2, Regular rate and rhythm, No murmur, No edema  Respiratory system- B/L clear breath sounds, no wheezing  Abdomen- Soft, Non distended, mild epigastric tenderness, Bowel sound- present 4 quads  Genitourinary- No suprapubic tenderness, No CVA tenderness  Skin- No new bruise or rash  Musculoskeletal- No gross deformity  Psych- No acute psychosis  CNS- CN II- XII grossly intact, No acute focal neurologic deficit noted      Discussion with Family: Patient has been updated thoroughly  Discharge instructions/Information to patient and family:   See after visit summary for information provided to patient and family  Provisions for Follow-Up Care:  See after visit summary for information related to follow-up care and any pertinent home health orders  Disposition:   Other: Patient is going to alcohol rehab at [facility] Readmission:  No     Discharge Statement:  I spent 45 minutes discharging the patient   This time was spent on the day of discharge  I had direct contact with the patient on the day of discharge  Greater than 50% of the total time was spent examining patient, answering all patient questions, arranging and discussing plan of care with patient as well as directly providing post-discharge instructions  Additional time then spent on discharge activities  Discharge Medications:  See after visit summary for reconciled discharge medications provided to patient and/or family        **Please Note: This note may have been constructed using a voice recognition system**

## 2021-09-21 NOTE — UTILIZATION REVIEW
Please see initial review completed on 9/20/21 by FATIMAH Handy RN  Medical DX  Admitting DX:  Pancreatitis [K85 90]  Abdominal pain [R10 9]  Gallstones [K80 20]     Ordered   Inpatient Admission Once     Question Answer Comment   Level of Care Med Surg    Estimated length of stay More than 2 Midnights    Certification I certify that inpatient services are medically necessary for this patient for a duration of greater than two midnights  See H&P and MD Progress Notes for additional information about the patient's course of treatment  09/19/21 0414     9/19 GI Note:  64year old male with a long history of alcohol abuse was attending the [address] [place] [address] for alcohol rehab when he developed the sudden onset of epigastric abdominal pain radiating to the right upper quadrant  Pain was associated with nausea and two episodes of vomiting  No hematemesis  His last alcohol was 3 days earlier  Patient has had one prior episode of pancreatitis  He was also complaining of constipation with no bowel movement for 6 days  No history of narcotic analgesics  He was given an enema at the rehab facility with no BM  Patient came to the ER and it was documented that he had a large BM      CT scan of the abdomen shows right basilar subsegmental atelectatic changes, a fatty liver, minimal fluid about the body and tail, calcification in the head of the pancreas, air fluid level in the rectum, diverticulosis, an ill-defined area of hypodensity in the right lobe of the liver, a questionable noncalcified stone in the gallbladder neck, a 2 8 X 1 7 cm well circumscribed exophytic lesion from the left kidney which does not meet CT criteria for a simple cyst        The CBC is normal    The liver enzymes were normal   Lipase level was 1,162        Network Utilization Review Department  ATTENTION: Please call with any questions or concerns to [phone] and carefully listen to the prompts so that you are directed to the right person  All voicemails are confidential   Cloyde Havers all requests for admission clinical reviews, approved or denied determinations and any other requests to dedicated fax number below belonging to the campus where the patient is receiving treatment   List of dedicated fax numbers for the Facilities:  [address] DENIALS (Administrative/Medical Necessity) [phone]   [address] (Maternity/NICU/Pediatrics) [phone] [address] [address] [place] [address] [address] [facility] [place] [address] [phone]

## 2021-09-22 VITALS
SYSTOLIC BLOOD PRESSURE: 127 MMHG | WEIGHT: 195 LBS | RESPIRATION RATE: 17 BRPM | OXYGEN SATURATION: 97 % | BODY MASS INDEX: 25.03 KG/M2 | HEIGHT: 74 IN | TEMPERATURE: 98.9 F | HEART RATE: 102 BPM | DIASTOLIC BLOOD PRESSURE: 77 MMHG